# Patient Record
Sex: FEMALE | Race: WHITE | Employment: FULL TIME | URBAN - METROPOLITAN AREA
[De-identification: names, ages, dates, MRNs, and addresses within clinical notes are randomized per-mention and may not be internally consistent; named-entity substitution may affect disease eponyms.]

---

## 2019-06-15 ENCOUNTER — HOSPITAL ENCOUNTER (EMERGENCY)
Age: 34
Discharge: HOME | End: 2019-06-15
Payer: COMMERCIAL

## 2019-06-15 VITALS
BODY MASS INDEX: 24.51 KG/M2 | RESPIRATION RATE: 20 BRPM | TEMPERATURE: 98.2 F | OXYGEN SATURATION: 98 % | HEART RATE: 75 BPM | DIASTOLIC BLOOD PRESSURE: 78 MMHG | SYSTOLIC BLOOD PRESSURE: 118 MMHG | WEIGHT: 134 LBS

## 2019-06-15 DIAGNOSIS — J02.9 VIRAL PHARYNGITIS: Primary | ICD-10-CM

## 2019-06-15 LAB — STREP GRP A PCR: NEGATIVE

## 2019-06-15 PROCEDURE — 87880 STREP A ASSAY W/OPTIC: CPT

## 2019-06-15 PROCEDURE — 99212 OFFICE O/P EST SF 10 MIN: CPT

## 2019-06-15 ASSESSMENT — PAIN DESCRIPTION - PAIN TYPE: TYPE: ACUTE PAIN

## 2019-06-15 ASSESSMENT — PAIN DESCRIPTION - LOCATION: LOCATION: THROAT

## 2019-06-15 ASSESSMENT — PAIN SCALES - GENERAL: PAINLEVEL_OUTOF10: 4

## 2019-06-15 NOTE — ED PROVIDER NOTES
This is a 75-year-old female who presents to urgent care complaining of a sore throat for the last day or 2. She wants to make sure it is not strep throat. She works in [place]. States pain is mild to moderate in nature at times. Denies any difficulty breathing or swallowing. No abdominal pain nausea vomiting diarrhea or urinary symptoms. Review of Systems   Constitutional:        Pertinent positives and negatives are stated within HPI, all other systems reviewed and are negative. Physical Exam   Constitutional: She is oriented to person, place, and time. She appears well-developed and well-nourished. HENT:   Head: Normocephalic and atraumatic. Right Ear: Hearing, tympanic membrane, external ear and ear canal normal.   Left Ear: Hearing, tympanic membrane, external ear and ear canal normal.   Nose: Nose normal. Right sinus exhibits no maxillary sinus tenderness and no frontal sinus tenderness. Left sinus exhibits no maxillary sinus tenderness and no frontal sinus tenderness. Mouth/Throat: Uvula is midline and mucous membranes are normal. No trismus in the jaw. No uvula swelling. Posterior oropharyngeal erythema (mild) present. No oropharyngeal exudate, posterior oropharyngeal edema or tonsillar abscesses. Eyes: Pupils are equal, round, and reactive to light. Conjunctivae, EOM and lids are normal.   Neck: Normal range of motion and full passive range of motion without pain. Neck supple. Cardiovascular: Normal rate, regular rhythm and normal heart sounds. No murmur heard. Pulmonary/Chest: Effort normal and breath sounds normal.   Abdominal: Soft. Bowel sounds are normal. There is no tenderness. There is no rigidity, no rebound, no guarding and no CVA tenderness. Musculoskeletal: She exhibits no edema. Neurological: She is alert and oriented to person, place, and time. She has normal strength. No cranial nerve deficit or sensory deficit.  Coordination and gait normal. GCS eye subscore is 4. GCS verbal subscore is 5. GCS motor subscore is 6. Skin: Skin is warm and dry. No abrasion and no rash noted. Nursing note and vitals reviewed. Procedures    MDM  Number of Diagnoses or Management Options  Viral pharyngitis:   Diagnosis management comments: Strep negative  prob viral  ibuprofen           --------------------------------------------- PAST HISTORY ---------------------------------------------  Past Medical History:  has a past medical history of Migraine and Thyroid disease. Past Surgical History:  has a past surgical history that includes Tonsillectomy (Bilateral). Social History:  reports that she quit smoking about 4 years ago. Her smoking use included cigarettes. She smoked 0.50 packs per day. She has never used smokeless tobacco. She reports that she drinks alcohol. She reports that she does not use drugs. Family History: family history is not on file. The patients home medications have been reviewed. Allergies: Pcn [penicillins]    -------------------------------------------------- RESULTS -------------------------------------------------  Results for orders placed or performed during the hospital encounter of 06/15/19   Strep Screen Group A Throat   Result Value Ref Range    Strep Grp A PCR Negative Negative     No orders to display       ------------------------- NURSING NOTES AND VITALS REVIEWED ---------------------------   The nursing notes within the ED encounter and vital signs as below have been reviewed.    /78   Pulse 75   Temp 98.2 °F (36.8 °C) (Oral)   Resp 20   Wt 134 lb (60.8 kg)   LMP 06/07/2019   SpO2 98%   BMI 24.51 kg/m²   Oxygen Saturation Interpretation: Normal      ------------------------------------------ PROGRESS NOTES ------------------------------------------   I have spoken with the patient and discussed todays results, in addition to providing specific details for the plan of care and counseling regarding the

## 2021-05-09 ENCOUNTER — HOSPITAL ENCOUNTER (EMERGENCY)
Age: 36
Discharge: HOME | End: 2021-05-09
Payer: COMMERCIAL

## 2021-05-09 VITALS
RESPIRATION RATE: 16 BRPM | TEMPERATURE: 97.8 F | HEART RATE: 74 BPM | WEIGHT: 135 LBS | DIASTOLIC BLOOD PRESSURE: 79 MMHG | SYSTOLIC BLOOD PRESSURE: 116 MMHG | OXYGEN SATURATION: 100 % | BODY MASS INDEX: 24.84 KG/M2 | HEIGHT: 62 IN

## 2021-05-09 DIAGNOSIS — H10.9 CONJUNCTIVITIS OF BOTH EYES, UNSPECIFIED CONJUNCTIVITIS TYPE: Primary | ICD-10-CM

## 2021-05-09 PROCEDURE — 99211 OFF/OP EST MAY X REQ PHY/QHP: CPT

## 2021-05-09 RX ORDER — TOBRAMYCIN 3 MG/ML
1 SOLUTION/ DROPS OPHTHALMIC EVERY 4 HOURS
Qty: 1 BOTTLE | Refills: 0 | Status: SHIPPED | OUTPATIENT
Start: 2021-05-09 | End: 2021-05-19

## 2021-05-09 ASSESSMENT — PAIN SCALES - GENERAL: PAINLEVEL_OUTOF10: 4

## 2021-05-09 ASSESSMENT — PAIN DESCRIPTION - PROGRESSION: CLINICAL_PROGRESSION: GRADUALLY WORSENING

## 2021-05-09 ASSESSMENT — PAIN DESCRIPTION - LOCATION: LOCATION: EYE

## 2021-05-09 ASSESSMENT — PAIN DESCRIPTION - ORIENTATION: ORIENTATION: RIGHT;LEFT

## 2021-05-09 ASSESSMENT — PAIN DESCRIPTION - DESCRIPTORS: DESCRIPTORS: BURNING;DISCOMFORT

## 2021-05-09 NOTE — ED PROVIDER NOTES
HPI:  5/9/21,   Time: 10:45 AM EDT         Vielka Langford is a 38 y.o. female presenting to the ED for irritation to the bilateral eyes, beginning yesterday ago. The complaint has been persistent, mild in severity, and worsened by nothing. Complaining of irritation to the bilateral eyes which she states began yesterday. States upon waking today there was large amounts of crusted material and drainage from the bilateral eyes left worse than the right. She does wear contact lens however currently has her eyeglasses on. Denies any blurred vision or double vision. ROS:   Pertinent positives and negatives are stated within HPI, all other systems reviewed and are negative.  --------------------------------------------- PAST HISTORY ---------------------------------------------  Past Medical History:  has a past medical history of Migraine, Seasonal allergies, and Thyroid disease. Past Surgical History:  has a past surgical history that includes Tonsillectomy (Bilateral). Social History:  reports that she quit smoking about 5 years ago. Her smoking use included cigarettes. She smoked 0.50 packs per day. She has never used smokeless tobacco. She reports current alcohol use. She reports that she does not use drugs. Family History: family history is not on file. The patients home medications have been reviewed. Allergies: Pcn [penicillins]    -------------------------------------------------- RESULTS -------------------------------------------------  All laboratory and radiology results have been personally reviewed by myself   LABS:  No results found for this visit on 05/09/21. RADIOLOGY:  Interpreted by Radiologist.  No orders to display       ------------------------- NURSING NOTES AND VITALS REVIEWED ---------------------------   The nursing notes within the ED encounter and vital signs as below have been reviewed.    /79   Pulse 74   Temp 97.8 °F (36.6 °C) (Infrared)   Resp 16

## 2024-05-28 ENCOUNTER — TELEPHONE (OUTPATIENT)
Dept: BREAST CENTER | Age: 39
End: 2024-05-28

## 2024-05-28 NOTE — TELEPHONE ENCOUNTER
Patient is a new referral to the breast clinic. A breast biopsy has been recommended. She does not take any blood thinners. She will obtain a disc from [facility] with her recent mammogram and ultrasound on it and then bring it to our center for further review before proceeding with a biopsy.  No other imaging prior to this date.

## 2024-05-30 ENCOUNTER — TELEPHONE (OUTPATIENT)
Dept: BREAST CENTER | Age: 39
End: 2024-05-30

## 2024-05-30 DIAGNOSIS — N64.59 ABNORMAL BREAST FINDING: Primary | ICD-10-CM

## 2024-05-30 NOTE — TELEPHONE ENCOUNTER
Patient notified of her imaging review and recommendations. [facility] Tracking form scanned to chart. Will place orders and have schedulers call her to coordinate.

## 2024-06-06 ENCOUNTER — HOSPITAL ENCOUNTER (OUTPATIENT)
Dept: GENERAL RADIOLOGY | Age: 39
Discharge: HOME | End: 2024-06-08
Attending: SURGERY
Payer: COMMERCIAL

## 2024-06-06 VITALS — WEIGHT: 128 LBS | BODY MASS INDEX: 23.55 KG/M2 | HEIGHT: 62 IN

## 2024-06-06 DIAGNOSIS — N64.59 ABNORMAL BREAST FINDING: ICD-10-CM

## 2024-06-06 PROCEDURE — 77065 DX MAMMO INCL CAD UNI: CPT

## 2024-06-06 PROCEDURE — 19083 BX BREAST 1ST LESION US IMAG: CPT

## 2024-06-06 PROCEDURE — 10035 PLMT SFT TISS LOCLZJ DEV 1ST: CPT

## 2024-06-06 PROCEDURE — 76942 ECHO GUIDE FOR BIOPSY: CPT

## 2024-06-06 PROCEDURE — 76882 US LMTD JT/FCL EVL NVASC XTR: CPT

## 2024-06-06 NOTE — PROGRESS NOTES
Met with patient and her  prior to her biopsies. Instructed on ultrasound guided right breast and right axillary core needle biopsies with clip placements. Denies use of blood thinners or aspirin products within the past 5 days. I remained with her during the biopsy. She tolerated biopsy well. Instructed that results will be available in approximately 3-5 business days. She does not have an active [facility] MyChart account but will attempt to sign up for it. She is agreeable to discussion of breast / axillary biopsy results by phone. Instructed that her physician will also be notified of results. Provided with folder containing contact information and post biopsy discharge instructions. Instructed to call if she has any questions or concerns about her biopsy. Verbalizes understanding.

## 2024-06-12 LAB — SURGICAL PATHOLOGY REPORT: NORMAL

## 2024-06-13 ENCOUNTER — TELEPHONE (OUTPATIENT)
Dept: GENERAL RADIOLOGY | Age: 39
End: 2024-06-13

## 2024-06-13 ENCOUNTER — TELEPHONE (OUTPATIENT)
Dept: BREAST CENTER | Age: 39
End: 2024-06-13

## 2024-06-13 DIAGNOSIS — Z17.0 MALIGNANT NEOPLASM OF LEFT BREAST IN FEMALE, ESTROGEN RECEPTOR POSITIVE, UNSPECIFIED SITE OF BREAST (HCC): Primary | ICD-10-CM

## 2024-06-13 DIAGNOSIS — C50.912 MALIGNANT NEOPLASM OF LEFT BREAST IN FEMALE, ESTROGEN RECEPTOR POSITIVE, UNSPECIFIED SITE OF BREAST (HCC): Primary | ICD-10-CM

## 2024-06-13 NOTE — TELEPHONE ENCOUNTER
Call placed to patient. She is a new referral for newly diagnosed right breast cancer +/+/-. Patient had imaging at [facility] but right breast and right axilla biopsy was performed at [facility].  Discussed consultation with patient and arrival time of 0930 on 6/14/24 for CXR and office visit with Dr Hill at 1030. Patient states her , Cliff will probably be with her at the appointment.    Electronically signed by Anyi Armenta RN on 6/13/24 at 10:28 AM EDT

## 2024-06-13 NOTE — TELEPHONE ENCOUNTER
Per patient request at time of her right axillary and right breast biopsies, I called with her with the results. Instructed that the results of her right axillary biopsy indicates benign lymph node and adipose tissue segments. Instructed that the results of her right breast biopsy indicates invasive ductal carcinoma (ER / CT positive, HER-2 negative). Instructed on cancer type and hormone receptor status. Patient has already been referred to Dr. Austyn Quesada. I advised her that his office will be in contact with her soon. Instructed on next steps including surgical consultation and metastatic workup. Also, patient knows that Dr. Quesada will refer her to a medical oncologist. Questions answered to her apparent satisfaction. She is agreeable to receiving information by mail. Packet with information on treatment of invasive breast cancer and local resources placed in outgoing mail bin.  Breast pathology report faxed to Dr. Austyn Quesada.

## 2024-06-14 ENCOUNTER — TELEPHONE (OUTPATIENT)
Dept: BREAST CENTER | Age: 39
End: 2024-06-14

## 2024-06-14 ENCOUNTER — OFFICE VISIT (OUTPATIENT)
Dept: BREAST CENTER | Age: 39
End: 2024-06-14
Payer: COMMERCIAL

## 2024-06-14 ENCOUNTER — TELEPHONE (OUTPATIENT)
Dept: CASE MANAGEMENT | Age: 39
End: 2024-06-14

## 2024-06-14 ENCOUNTER — HOSPITAL ENCOUNTER (OUTPATIENT)
Dept: GENERAL RADIOLOGY | Age: 39
End: 2024-06-14
Attending: SURGERY
Payer: COMMERCIAL

## 2024-06-14 VITALS
HEART RATE: 92 BPM | BODY MASS INDEX: 23.74 KG/M2 | OXYGEN SATURATION: 98 % | SYSTOLIC BLOOD PRESSURE: 112 MMHG | HEIGHT: 62 IN | WEIGHT: 129 LBS | RESPIRATION RATE: 12 BRPM | TEMPERATURE: 98.2 F | DIASTOLIC BLOOD PRESSURE: 68 MMHG

## 2024-06-14 DIAGNOSIS — Z85.3 PERSONAL HISTORY OF MALIGNANT NEOPLASM OF BREAST: ICD-10-CM

## 2024-06-14 DIAGNOSIS — Z17.0 MALIGNANT NEOPLASM OF LEFT BREAST IN FEMALE, ESTROGEN RECEPTOR POSITIVE, UNSPECIFIED SITE OF BREAST (HCC): ICD-10-CM

## 2024-06-14 DIAGNOSIS — C50.911 INVASIVE DUCTAL CARCINOMA OF BREAST, FEMALE, RIGHT (HCC): Primary | ICD-10-CM

## 2024-06-14 DIAGNOSIS — C50.912 MALIGNANT NEOPLASM OF LEFT BREAST IN FEMALE, ESTROGEN RECEPTOR POSITIVE, UNSPECIFIED SITE OF BREAST (HCC): ICD-10-CM

## 2024-06-14 LAB
ALBUMIN: 4.5 G/DL (ref 3.5–5.2)
ALP BLD-CCNC: 77 U/L (ref 35–104)
ALT SERPL-CCNC: 6 U/L (ref 0–32)
ANION GAP SERPL CALCULATED.3IONS-SCNC: 14 MMOL/L (ref 7–16)
AST SERPL-CCNC: 14 U/L (ref 0–31)
BASOPHILS ABSOLUTE: 0.05 K/UL (ref 0–0.2)
BASOPHILS RELATIVE PERCENT: 1 % (ref 0–2)
BILIRUB SERPL-MCNC: 0.5 MG/DL (ref 0–1.2)
BUN BLDV-MCNC: 13 MG/DL (ref 6–20)
CALCIUM SERPL-MCNC: 9.2 MG/DL (ref 8.6–10.2)
CHLORIDE BLD-SCNC: 108 MMOL/L (ref 98–107)
CO2: 16 MMOL/L (ref 22–29)
CREAT SERPL-MCNC: 0.7 MG/DL (ref 0.5–1)
EOSINOPHILS ABSOLUTE: 0.03 K/UL (ref 0.05–0.5)
EOSINOPHILS RELATIVE PERCENT: 1 % (ref 0–6)
GFR, ESTIMATED: >90 ML/MIN/1.73M2
GLUCOSE BLD-MCNC: 97 MG/DL (ref 74–99)
HCT VFR BLD CALC: 43.5 % (ref 34–48)
HEMOGLOBIN: 14.6 G/DL (ref 11.5–15.5)
IMMATURE GRANULOCYTES %: 1 % (ref 0–5)
IMMATURE GRANULOCYTES ABSOLUTE: 0.03 K/UL (ref 0–0.58)
LYMPHOCYTES ABSOLUTE: 2.16 K/UL (ref 1.5–4)
LYMPHOCYTES RELATIVE PERCENT: 37 % (ref 20–42)
MCH RBC QN AUTO: 31.8 PG (ref 26–35)
MCHC RBC AUTO-ENTMCNC: 33.6 G/DL (ref 32–34.5)
MCV RBC AUTO: 94.8 FL (ref 80–99.9)
MONOCYTES ABSOLUTE: 0.26 K/UL (ref 0.1–0.95)
MONOCYTES RELATIVE PERCENT: 4 % (ref 2–12)
NEUTROPHILS ABSOLUTE: 3.37 K/UL (ref 1.8–7.3)
NEUTROPHILS RELATIVE PERCENT: 57 % (ref 43–80)
PDW BLD-RTO: 12.5 % (ref 11.5–15)
PLATELET # BLD: 248 K/UL (ref 130–450)
PMV BLD AUTO: 11.1 FL (ref 7–12)
POTASSIUM SERPL-SCNC: 4.2 MMOL/L (ref 3.5–5)
RBC # BLD: 4.59 M/UL (ref 3.5–5.5)
SODIUM BLD-SCNC: 138 MMOL/L (ref 132–146)
TOTAL PROTEIN: 7.2 G/DL (ref 6.4–8.3)
WBC # BLD: 5.9 K/UL (ref 4.5–11.5)

## 2024-06-14 PROCEDURE — 71046 X-RAY EXAM CHEST 2 VIEWS: CPT

## 2024-06-14 PROCEDURE — G8420 CALC BMI NORM PARAMETERS: HCPCS | Performed by: SURGERY

## 2024-06-14 PROCEDURE — G8427 DOCREV CUR MEDS BY ELIG CLIN: HCPCS | Performed by: SURGERY

## 2024-06-14 PROCEDURE — 99203 OFFICE O/P NEW LOW 30 MIN: CPT | Performed by: SURGERY

## 2024-06-14 PROCEDURE — 1036F TOBACCO NON-USER: CPT | Performed by: SURGERY

## 2024-06-14 PROCEDURE — 99205 OFFICE O/P NEW HI 60 MIN: CPT | Performed by: SURGERY

## 2024-06-14 PROCEDURE — 36415 COLL VENOUS BLD VENIPUNCTURE: CPT | Performed by: SURGERY

## 2024-06-14 RX ORDER — ZONISAMIDE 100 MG/1
CAPSULE ORAL
COMMUNITY
Start: 2023-06-12

## 2024-06-14 RX ORDER — FLUTICASONE PROPIONATE 50 MCG
SPRAY, SUSPENSION (ML) NASAL
COMMUNITY
Start: 2024-05-22

## 2024-06-14 NOTE — PROGRESS NOTES
[facility]  HISTORY & PHYSICAL  ATTENDING NOTE    Patient's Name/Date of Birth: Linda Bustamante / 1985    Date: 2024     Chief Complaint   Patient presents with    Breast Cancer     Right breast Invasive Ductal carcinoma-patient found lump herself  - imaging [facility] [address] / Biopsy [facility]         Linda Bustamante presents for evaluation of a mammographic abnormality.    PCP: Yolis Chandler MD. Gynecologist: Martin.    Referred by:  Dr. Chandler    The mammogram was performed at [facility] on 2024. The patient has not noted a change in BSE since presentation.  Patient denies nipple discharge. Patient denies a personal history of breast cancer.  Breast cancer risk factors include family hx on father's side and gender.  Ashkenazi Yazdanism Ancestry: No.    [place] on may 21    OBSTETRIC RELATED HISTORY:  Age of menarche was 13.   Age of menopause was N/a    Patient denies hormonal therapy.   Patient is .   Age of first live birth was 14.   Patient did not breast feed.  Is patient interested in fertility information about fertility preservation? No    CANCER SURVEILLANCE HISTORY:  Mammograms: No   Breast MRI's: No   Breast Biopsies: No   Colonoscopy: No   GI Polyps: Not Applicable   EGD: No   Pelvic Exam: Yes   Pap Smear: Yes   Dermatology: No   Lung screening: no  H/O DVT:  no  H/O Radiation:  no    Children ar 15y to 23y        Estimated body mass index is 23.59 kg/m² as calculated from the following:    Height as of this encounter: 1.575 m (5' 2\").    Weight as of this encounter: 58.5 kg (129 lb).  Bra Size: 36B    Because violence is so common, we ask all our patients: are you in a relationship or do you live with a person who threatens, hurts, or controls you:  no    Patient drinks significant caffeinated beverages. Patient does not smoke cigarettes. Patient does not use recreational drugs.      Past Medical History:   Diagnosis Date    Hypothyroidism     Invasive

## 2024-06-14 NOTE — TELEPHONE ENCOUNTER
Authorization has been obtained for breast MRI  [id number] -- Approval # [id number] valid 6/14/24 - 8/13/24  per [facility] insurance

## 2024-06-14 NOTE — TELEPHONE ENCOUNTER
Met with patient regarding her recent breast cancer diagnosis at surgical consultation appointment with . Reviewed pathology report.Instructed patient on her  breast biopsy pathology findings including cancer type (IDC) and hormone receptor status (ER+, RI+, Her2-). Instructed on next steps including breast surgery options per 's recommendations and any additional imaging that may be required. Provided with extensive literature including \"Be A Survivor: Your guide to Breast Cancer Treatment\", chapter 4 reviewed, Your Guide to Your Breast Cancer Pathology Report, NCCN Patient Resource card,American College of Surgeons Exercises after Breast Surgery, written information from [employer] Lymphedema:The Basics and American Cancer Society Clinical Trials.Today patient received copy of their pathology report as well as a list of [facility] Medical Oncology providers, information on diagnosis, transportation resources and local/online support group resources. I also provided patient with flyers from CDC.gov on Be The [place], from [employer] Breast Cancer Concerns for Women under 45 along with flyer from [employer]. Encouraged patient to call the office with any medical oncology questions. Patient verbalizes understanding and appreciative of nurse navigator visit. MELBA SwansonN,RN-OCN

## 2024-06-14 NOTE — PATIENT INSTRUCTIONS
Office will get authorization for breast MRI. Once authorization done schedulers will call to schedule. They are done on Tuesday and Thursday in same office as .      will call with genetic testing results.     Any questions or concerns, please contact the office at [phone].

## 2024-06-21 LAB
Lab: NEGATIVE
Lab: NORMAL

## 2024-06-25 ENCOUNTER — HOSPITAL ENCOUNTER (OUTPATIENT)
Dept: MRI IMAGING | Age: 39
Discharge: HOME | End: 2024-06-27
Payer: COMMERCIAL

## 2024-06-25 ENCOUNTER — TELEPHONE (OUTPATIENT)
Dept: SURGERY | Age: 39
End: 2024-06-25

## 2024-06-25 DIAGNOSIS — C50.911 INVASIVE DUCTAL CARCINOMA OF BREAST, FEMALE, RIGHT (HCC): ICD-10-CM

## 2024-06-25 DIAGNOSIS — C50.911 INVASIVE DUCTAL CARCINOMA OF BREAST, FEMALE, RIGHT (HCC): Primary | ICD-10-CM

## 2024-06-25 PROCEDURE — 6360000004 HC RX CONTRAST MEDICATION: Performed by: RADIOLOGY

## 2024-06-25 PROCEDURE — C8908 MRI W/O FOL W/CONT, BREAST,: HCPCS

## 2024-06-25 PROCEDURE — A9585 GADOBUTROL INJECTION: HCPCS | Performed by: RADIOLOGY

## 2024-06-25 RX ORDER — GADOBUTROL 604.72 MG/ML
6 INJECTION INTRAVENOUS
Status: COMPLETED | OUTPATIENT
Start: 2024-06-25 | End: 2024-06-25

## 2024-06-25 RX ADMIN — GADOBUTROL 6 ML: 604.72 INJECTION INTRAVENOUS at 13:02

## 2024-06-25 NOTE — TELEPHONE ENCOUNTER
I called Linda and went over her MRI results and genetic testing.  I explained to her her genetic testing was negative.  I explained to her they do not test every possible gene out the least 81 genes and the most common genes that would cause breast cancer and she is negative for those.  I also went over her MRI of her breast which showed multiple other small masses that we did not know about.  The recommendation would be to do a mastectomy however she was against that she could have an ultrasound to see if we could better characterize these masses.  She states she is okay with mastectomy.  I went over the different aspects of reconstruction which would be implant-based or tissue base.  I explained to her most people in this area to go with implant-based and that is what our plastic surgeon usually does.  I explained to her that we could also give her a flat aesthetic closure but she would like to have some type of reconstruction.  I also talked to her about bilateral mastectomies however I told her that is not something that she has to do and that would be a personal choice.  I explained to her that just because she has breast cancer 1 side does not mean she is going to get it on the other side we can continue to monitor her with mammogram and MRIs.  She states she is most likely just going to do 1 side at this point in time.  I will place a referral to plastic surgery and we will schedule her surgery after her consultation with them.

## 2024-07-10 ENCOUNTER — OFFICE VISIT (OUTPATIENT)
Dept: SURGERY | Age: 39
End: 2024-07-10
Payer: COMMERCIAL

## 2024-07-10 VITALS
OXYGEN SATURATION: 97 % | TEMPERATURE: 97.8 F | HEART RATE: 63 BPM | SYSTOLIC BLOOD PRESSURE: 108 MMHG | BODY MASS INDEX: 23.34 KG/M2 | WEIGHT: 126.8 LBS | HEIGHT: 62 IN | DIASTOLIC BLOOD PRESSURE: 70 MMHG | RESPIRATION RATE: 18 BRPM

## 2024-07-10 DIAGNOSIS — C50.911 INVASIVE DUCTAL CARCINOMA OF BREAST, FEMALE, RIGHT (HCC): Primary | ICD-10-CM

## 2024-07-10 PROCEDURE — 99203 OFFICE O/P NEW LOW 30 MIN: CPT | Performed by: PLASTIC SURGERY

## 2024-07-10 PROCEDURE — G8427 DOCREV CUR MEDS BY ELIG CLIN: HCPCS | Performed by: PLASTIC SURGERY

## 2024-07-10 PROCEDURE — G8420 CALC BMI NORM PARAMETERS: HCPCS | Performed by: PLASTIC SURGERY

## 2024-07-10 PROCEDURE — 1036F TOBACCO NON-USER: CPT | Performed by: PLASTIC SURGERY

## 2024-07-10 NOTE — PROGRESS NOTES
Department of Plastic Surgery - Adult  Attending Consult Note          CHIEF COMPLAINT:  History of right Breast Cancer    History Obtained From:  patient,     HISTORY OF PRESENT ILLNESS:                The patient is a 38 y.o. female who presents with History of right Breast Cancer.  She is here today to discuss her options on breast reconstruction. The patient has met with Dr. Hill and has elected to proceed with  mastectomy.  She has no treatments of chemotherapy and will likely not be receiving radiation after mastectomy.  The patients Breast Cancer History is obtained from the patient and also notes from the care team.      Past Medical History:    Past Medical History:   Diagnosis Date    Hypothyroidism     Invasive ductal carcinoma of right breast (HCC) 2024    Migraine     Seasonal allergies     Thyroid disease      Past Surgical History:    Past Surgical History:   Procedure Laterality Date    MARIO NEEDLE BIOPSY LYMPH NODE SUPERFICIAL  2024    MARIO NEEDLE BIOPSY LYMPH NODE SUPERFICIAL 2024 [facility]    MARIO US PERQ DEVICE SOFT TISSUE PLMT  FIRST LESION  2024    MARIO US PERQ DEVICE SOFT TISSUE PLMT  FIRST LESION 2024 [facility]    TONSILLECTOMY Bilateral     US BREAST BIOPSY W LOC DEVICE 1ST LESION RIGHT Right 2024    US BREAST BIOPSY W LOC DEVICE 1ST LESION RIGHT 2024 [facility]     Current Medications:   No current facility-administered medications for this visit.  Allergies:  Pcn [penicillins]    Social History:   Social History     Socioeconomic History    Marital status:      Spouse name: Not on file    Number of children: Not on file    Years of education: Not on file    Highest education level: Not on file   Occupational History    Not on file   Tobacco Use    Smoking status: Former     Current packs/day: 0.00     Types: Cigarettes     Quit date: 2015     Years since quittin.0    Smokeless tobacco: Never   Substance and Sexual Activity

## 2024-07-16 ENCOUNTER — PREP FOR PROCEDURE (OUTPATIENT)
Dept: BREAST CENTER | Age: 39
End: 2024-07-16

## 2024-07-16 ENCOUNTER — TELEPHONE (OUTPATIENT)
Dept: SURGERY | Age: 39
End: 2024-07-16

## 2024-07-16 ENCOUNTER — TELEPHONE (OUTPATIENT)
Dept: BREAST CENTER | Age: 39
End: 2024-07-16

## 2024-07-16 DIAGNOSIS — C50.911 INVASIVE DUCTAL CARCINOMA OF BREAST, FEMALE, RIGHT (HCC): Primary | ICD-10-CM

## 2024-07-16 NOTE — TELEPHONE ENCOUNTER
Surgery has been scheduled at [address] on 8/13/24, Pre-Admission Testing will call you prior to surgery to inform you arrival time and any other additional directions,if they are unable to reach you,please call them two days prior at [phone].  If taking Fish Oil, Vitamins, two weeks prior to surgery stop taking. If taking NSAIDS (such as Aspirin, Ibuprofen) anticoagulants please consult with your prescribing physician to get further instructions on when to stop medication prior to surgery that is scheduled, patient understood.    Pre-Auth #: [id number]  CPT Codes: 40089, 68248  ICD 10: C50.911      Phone discussion was had with the patient today regarding upcoming surgery.  I had a discussion with the patient that although the patient's insurance company has approved the procedure proposed, there is no guarantee of payment by the insurance company on their final review following the procedure. This is also reflected in the letter received by this office and the patient from the insurance company.    The patient has voiced to me complete understanding of this scenario, understands that they will be responsible for their individual deductable/coinsurance balance as an out-of-pocket expense and possibly the financial responsibility of the surgical procedure if the patient's insurance company elects not to pay for certain or all services.    The patient elects to proceed with the proposed surgical plan.

## 2024-08-07 NOTE — PROGRESS NOTES
[facility]   PRE-ADMISSION TESTING GENERAL INSTRUCTIONS  PAT Phone Number: [phone]      GENERAL INSTRUCTIONS:    [x] Antibacterial Soap Shower the Night before AND the morning of surgery.  [] CHG Wipes instruction sheet and wipes given.  [x] Do not wear makeup, lotions, powders, deodorant the morning of surgery.  [x] No solid food after midnight. You may have SIPS of clear liquids up until 2 hours before your arrival time to the hospital.   [x] You may brush your teeth, gargle, but do not swallow water.   [] No tobacco products, illegal drugs, or alcohol within 24 hours of your surgery.  [x] Jewelry or valuables should not be brought to the hospital. All body and/or tongue piercing's must be removed prior to arriving to hospital. No contact lens or hair pins.   [x] Arrange transportation with a responsible adult  to and from the hospital. Arrange for someone to be with you for the remainder of the day and for 24 hours after your procedure due to having had anesthesia.          -Who will be your  for transportation? Cliff, spouse        -Who will be staying with you for 24 hrs after your procedure? cliff  [x] Bring insurance card and photo ID.  [] Bring copy of living will or healthcare power of  papers to be placed in your electronic record.  [x] Urine Pregnancy test will be preformed the day of surgery. A specimen sample may be brought from home.  [] Transfusion (Green) Bracelet: Please bring with you to hospital, day of surgery.     PARKING INSTRUCTIONS:     [x] ARRIVAL DATE & TIME: 8/13 @ 0630  [x] Times are subject to change. We will contact you the business day before surgery if that were to occur.  [x] Enter into the [facility] Entrance. Two people may accompany you. Masks are not required.  [x] Parking Lot \"I\" is where you will park. It is located on the corner of [address] and [address]. The entrance is on [address].   Only one

## 2024-08-12 ENCOUNTER — ANESTHESIA EVENT (OUTPATIENT)
Dept: OPERATING ROOM | Age: 39
End: 2024-08-12
Payer: COMMERCIAL

## 2024-08-12 RX ORDER — SODIUM CHLORIDE 9 MG/ML
INJECTION, SOLUTION INTRAVENOUS CONTINUOUS
Status: CANCELLED | OUTPATIENT
Start: 2024-08-12

## 2024-08-12 RX ORDER — SODIUM CHLORIDE 9 MG/ML
INJECTION, SOLUTION INTRAVENOUS PRN
Status: CANCELLED | OUTPATIENT
Start: 2024-08-12

## 2024-08-12 RX ORDER — SODIUM CHLORIDE 0.9 % (FLUSH) 0.9 %
5-40 SYRINGE (ML) INJECTION EVERY 12 HOURS SCHEDULED
Status: CANCELLED | OUTPATIENT
Start: 2024-08-12

## 2024-08-12 RX ORDER — SODIUM CHLORIDE 0.9 % (FLUSH) 0.9 %
5-40 SYRINGE (ML) INJECTION PRN
Status: CANCELLED | OUTPATIENT
Start: 2024-08-12

## 2024-08-13 ENCOUNTER — ANESTHESIA (OUTPATIENT)
Dept: OPERATING ROOM | Age: 39
End: 2024-08-13
Payer: COMMERCIAL

## 2024-08-13 ENCOUNTER — HOSPITAL ENCOUNTER (OUTPATIENT)
Dept: NUCLEAR MEDICINE | Age: 39
Discharge: HOME | End: 2024-08-15
Payer: COMMERCIAL

## 2024-08-13 ENCOUNTER — HOSPITAL ENCOUNTER (OUTPATIENT)
Age: 39
Setting detail: OUTPATIENT SURGERY
Discharge: HOME | End: 2024-08-13
Attending: SURGERY | Admitting: SURGERY
Payer: COMMERCIAL

## 2024-08-13 VITALS
RESPIRATION RATE: 19 BRPM | OXYGEN SATURATION: 100 % | DIASTOLIC BLOOD PRESSURE: 81 MMHG | BODY MASS INDEX: 22.63 KG/M2 | WEIGHT: 123 LBS | SYSTOLIC BLOOD PRESSURE: 119 MMHG | HEIGHT: 62 IN | HEART RATE: 78 BPM | TEMPERATURE: 97.6 F

## 2024-08-13 DIAGNOSIS — C50.911 INVASIVE DUCTAL CARCINOMA OF BREAST, FEMALE, RIGHT (HCC): ICD-10-CM

## 2024-08-13 DIAGNOSIS — C50.911 MALIGNANT NEOPLASM OF RIGHT BREAST (HCC): ICD-10-CM

## 2024-08-13 DIAGNOSIS — G89.18 POST-OP PAIN: ICD-10-CM

## 2024-08-13 DIAGNOSIS — Z01.812 PRE-OPERATIVE LABORATORY EXAMINATION: Primary | ICD-10-CM

## 2024-08-13 LAB
ERYTHROCYTE [DISTWIDTH] IN BLOOD BY AUTOMATED COUNT: 12.1 % (ref 11.5–15)
HCG, URINE, POC: NEGATIVE
HCT VFR BLD AUTO: 43.9 % (ref 34–48)
HGB BLD-MCNC: 15 G/DL (ref 11.5–15.5)
Lab: NORMAL
MCH RBC QN AUTO: 33.3 PG (ref 26–35)
MCHC RBC AUTO-ENTMCNC: 34.2 G/DL (ref 32–34.5)
MCV RBC AUTO: 97.6 FL (ref 80–99.9)
NEGATIVE QC PASS/FAIL: NORMAL
PLATELET # BLD AUTO: 229 K/UL (ref 130–450)
PMV BLD AUTO: 10.8 FL (ref 7–12)
POSITIVE QC PASS/FAIL: NORMAL
RBC # BLD AUTO: 4.5 M/UL (ref 3.5–5.5)
WBC OTHER # BLD: 6.5 K/UL (ref 4.5–11.5)

## 2024-08-13 PROCEDURE — 19357 TISS XPNDR PLMT BRST RCNSTJ: CPT | Performed by: PHYSICIAN ASSISTANT

## 2024-08-13 PROCEDURE — 38792 RA TRACER ID OF SENTINL NODE: CPT

## 2024-08-13 PROCEDURE — 76942 ECHO GUIDE FOR BIOPSY: CPT | Performed by: ANESTHESIOLOGY

## 2024-08-13 PROCEDURE — 6360000002 HC RX W HCPCS

## 2024-08-13 PROCEDURE — 3600000015 HC SURGERY LEVEL 5 ADDTL 15MIN: Performed by: SURGERY

## 2024-08-13 PROCEDURE — 3600000005 HC SURGERY LEVEL 5 BASE: Performed by: SURGERY

## 2024-08-13 PROCEDURE — A9520 TC99 TILMANOCEPT DIAG 0.5MCI: HCPCS | Performed by: RADIOLOGY

## 2024-08-13 PROCEDURE — 6360000002 HC RX W HCPCS: Performed by: ANESTHESIOLOGY

## 2024-08-13 PROCEDURE — 3700000001 HC ADD 15 MINUTES (ANESTHESIA): Performed by: SURGERY

## 2024-08-13 PROCEDURE — 15777 ACELLULAR DERM MATRIX IMPLT: CPT | Performed by: PLASTIC SURGERY

## 2024-08-13 PROCEDURE — 7100000010 HC PHASE II RECOVERY - FIRST 15 MIN: Performed by: SURGERY

## 2024-08-13 PROCEDURE — 38792 RA TRACER ID OF SENTINL NODE: CPT | Performed by: SURGERY

## 2024-08-13 PROCEDURE — 3430000000 HC RX DIAGNOSTIC RADIOPHARMACEUTICAL: Performed by: RADIOLOGY

## 2024-08-13 PROCEDURE — 19357 TISS XPNDR PLMT BRST RCNSTJ: CPT | Performed by: PLASTIC SURGERY

## 2024-08-13 PROCEDURE — C1713 ANCHOR/SCREW BN/BN,TIS/BN: HCPCS | Performed by: SURGERY

## 2024-08-13 PROCEDURE — 2580000003 HC RX 258: Performed by: PLASTIC SURGERY

## 2024-08-13 PROCEDURE — C1789 PROSTHESIS, BREAST, IMP: HCPCS | Performed by: SURGERY

## 2024-08-13 PROCEDURE — 2500000003 HC RX 250 WO HCPCS

## 2024-08-13 PROCEDURE — 7100000001 HC PACU RECOVERY - ADDTL 15 MIN: Performed by: SURGERY

## 2024-08-13 PROCEDURE — 38900 IO MAP OF SENT LYMPH NODE: CPT | Performed by: SURGERY

## 2024-08-13 PROCEDURE — 2720000010 HC SURG SUPPLY STERILE: Performed by: SURGERY

## 2024-08-13 PROCEDURE — 2500000003 HC RX 250 WO HCPCS: Performed by: ANESTHESIOLOGY

## 2024-08-13 PROCEDURE — 85027 COMPLETE CBC AUTOMATED: CPT

## 2024-08-13 PROCEDURE — 19303 MAST SIMPLE COMPLETE: CPT | Performed by: SURGERY

## 2024-08-13 PROCEDURE — 2709999900 HC NON-CHARGEABLE SUPPLY: Performed by: SURGERY

## 2024-08-13 PROCEDURE — 7100000000 HC PACU RECOVERY - FIRST 15 MIN: Performed by: SURGERY

## 2024-08-13 PROCEDURE — 7100000011 HC PHASE II RECOVERY - ADDTL 15 MIN: Performed by: SURGERY

## 2024-08-13 PROCEDURE — 6370000000 HC RX 637 (ALT 250 FOR IP): Performed by: ANESTHESIOLOGY

## 2024-08-13 PROCEDURE — 3700000000 HC ANESTHESIA ATTENDED CARE: Performed by: SURGERY

## 2024-08-13 PROCEDURE — 38525 BIOPSY/REMOVAL LYMPH NODES: CPT | Performed by: SURGERY

## 2024-08-13 PROCEDURE — 6360000002 HC RX W HCPCS: Performed by: PLASTIC SURGERY

## 2024-08-13 PROCEDURE — 2580000003 HC RX 258

## 2024-08-13 PROCEDURE — 2500000003 HC RX 250 WO HCPCS: Performed by: PLASTIC SURGERY

## 2024-08-13 PROCEDURE — A4217 STERILE WATER/SALINE, 500 ML: HCPCS | Performed by: PLASTIC SURGERY

## 2024-08-13 DEVICE — GRAFT HUM TISS 132 SQ CM THK2-2.8MM THCK M PERF CNTOUR ACELLULAR DERM: Type: IMPLANTABLE DEVICE | Site: BREAST | Status: FUNCTIONAL

## 2024-08-13 DEVICE — STIMULAN® RAPID CURE PROVIDED STERILE FOR SINGLE PATIENT USE. STIMULAN® RAPID CURE CONTAINS CALCIUM SULFATE POWDER AND MIXING SOLUTION IN PRE-MEASURED QUANTITIES SO THAT WHEN MIXED TOGETHER IN A STERILE MIXING BOWL, THE RESULTANT PASTE IS TO BE DIGITALLY PACKED INTO OPEN BONE VOID/GAP TO SET INSITU OR PLACED INTO THE MOULD PROVIDED, THE MIXTURE SETS TO FORM BEADS. THE BIODEGRADABLE, RADIOPAQUE BEADS ARE RESORBED IN APPROXIMATELY 30 – 60 DAYS WHEN USED IN ACCORDANCE WITH THE DEVICE LABELLING. STIMULAN® RAPID CURE IS MANUFACTURED FROM SYNTHETIC IMPLANT GRADE CALCIUM SULFATE DIHYDRATE(CASO4.2H2O) THAT RESORBS AND IS REPLACED WITH BONE DURING THE HEALING PROCESS. ALSO, AS THE BONE VOID FILLER BEADS ARE BIODEGRADABLE AND BIOCOMPATIBLE, THEY MAY BE USED AT AN INFECTED SITE.
Type: IMPLANTABLE DEVICE | Site: BREAST | Status: FUNCTIONAL
Brand: STIMULAN® RAPID CURE

## 2024-08-13 DEVICE — SILTEX MEDIUM HEIGHT TISSUE EXPANDER STYLE 8200, 450CC
Type: IMPLANTABLE DEVICE | Site: BREAST | Status: FUNCTIONAL
Brand: MENTOR CPX 4 BREAST TISSUE EXPANDER

## 2024-08-13 RX ORDER — SODIUM CHLORIDE 9 MG/ML
INJECTION, SOLUTION INTRAVENOUS CONTINUOUS PRN
Status: DISCONTINUED | OUTPATIENT
Start: 2024-08-13 | End: 2024-08-13 | Stop reason: SDUPTHER

## 2024-08-13 RX ORDER — CEFAZOLIN SODIUM 1 G/3ML
INJECTION, POWDER, FOR SOLUTION INTRAMUSCULAR; INTRAVENOUS PRN
Status: DISCONTINUED | OUTPATIENT
Start: 2024-08-13 | End: 2024-08-13 | Stop reason: SDUPTHER

## 2024-08-13 RX ORDER — HYDROMORPHONE HYDROCHLORIDE 1 MG/ML
0.25 INJECTION, SOLUTION INTRAMUSCULAR; INTRAVENOUS; SUBCUTANEOUS EVERY 5 MIN PRN
Status: DISCONTINUED | OUTPATIENT
Start: 2024-08-13 | End: 2024-08-13 | Stop reason: HOSPADM

## 2024-08-13 RX ORDER — NALOXONE HYDROCHLORIDE 0.4 MG/ML
INJECTION, SOLUTION INTRAMUSCULAR; INTRAVENOUS; SUBCUTANEOUS PRN
Status: DISCONTINUED | OUTPATIENT
Start: 2024-08-13 | End: 2024-08-13 | Stop reason: HOSPADM

## 2024-08-13 RX ORDER — SODIUM CHLORIDE 0.9 % (FLUSH) 0.9 %
5-40 SYRINGE (ML) INJECTION PRN
Status: DISCONTINUED | OUTPATIENT
Start: 2024-08-13 | End: 2024-08-13 | Stop reason: HOSPADM

## 2024-08-13 RX ORDER — SODIUM CHLORIDE 0.9 % (FLUSH) 0.9 %
5-40 SYRINGE (ML) INJECTION EVERY 12 HOURS SCHEDULED
Status: DISCONTINUED | OUTPATIENT
Start: 2024-08-13 | End: 2024-08-13 | Stop reason: HOSPADM

## 2024-08-13 RX ORDER — OXYCODONE HYDROCHLORIDE 5 MG/1
10 TABLET ORAL PRN
Status: COMPLETED | OUTPATIENT
Start: 2024-08-13 | End: 2024-08-13

## 2024-08-13 RX ORDER — DIPHENHYDRAMINE HYDROCHLORIDE 50 MG/ML
12.5 INJECTION INTRAMUSCULAR; INTRAVENOUS
Status: DISCONTINUED | OUTPATIENT
Start: 2024-08-13 | End: 2024-08-13 | Stop reason: HOSPADM

## 2024-08-13 RX ORDER — PROPOFOL 10 MG/ML
INJECTION, EMULSION INTRAVENOUS PRN
Status: DISCONTINUED | OUTPATIENT
Start: 2024-08-13 | End: 2024-08-13 | Stop reason: SDUPTHER

## 2024-08-13 RX ORDER — OXYCODONE AND ACETAMINOPHEN 5; 325 MG/1; MG/1
1 TABLET ORAL EVERY 6 HOURS PRN
Qty: 15 TABLET | Refills: 0 | Status: SHIPPED | OUTPATIENT
Start: 2024-08-13 | End: 2024-08-20

## 2024-08-13 RX ORDER — MAGNESIUM HYDROXIDE 1200 MG/15ML
LIQUID ORAL CONTINUOUS PRN
Status: DISCONTINUED | OUTPATIENT
Start: 2024-08-13 | End: 2024-08-13 | Stop reason: HOSPADM

## 2024-08-13 RX ORDER — BUPIVACAINE HYDROCHLORIDE AND EPINEPHRINE 2.5; 5 MG/ML; UG/ML
INJECTION, SOLUTION EPIDURAL; INFILTRATION; INTRACAUDAL; PERINEURAL PRN
Status: DISCONTINUED | OUTPATIENT
Start: 2024-08-13 | End: 2024-08-13 | Stop reason: ALTCHOICE

## 2024-08-13 RX ORDER — SODIUM CHLORIDE 9 MG/ML
INJECTION, SOLUTION INTRAVENOUS CONTINUOUS
Status: DISCONTINUED | OUTPATIENT
Start: 2024-08-13 | End: 2024-08-13 | Stop reason: HOSPADM

## 2024-08-13 RX ORDER — LIDOCAINE HYDROCHLORIDE 20 MG/ML
INJECTION, SOLUTION INTRAVENOUS PRN
Status: DISCONTINUED | OUTPATIENT
Start: 2024-08-13 | End: 2024-08-13 | Stop reason: SDUPTHER

## 2024-08-13 RX ORDER — FENTANYL CITRATE 50 UG/ML
INJECTION, SOLUTION INTRAMUSCULAR; INTRAVENOUS PRN
Status: DISCONTINUED | OUTPATIENT
Start: 2024-08-13 | End: 2024-08-13 | Stop reason: SDUPTHER

## 2024-08-13 RX ORDER — GENTAMICIN 40 MG/ML
INJECTION, SOLUTION INTRAMUSCULAR; INTRAVENOUS PRN
Status: DISCONTINUED | OUTPATIENT
Start: 2024-08-13 | End: 2024-08-13 | Stop reason: HOSPADM

## 2024-08-13 RX ORDER — ROPIVACAINE HYDROCHLORIDE 5 MG/ML
30 INJECTION, SOLUTION EPIDURAL; INFILTRATION; PERINEURAL
Status: COMPLETED | OUTPATIENT
Start: 2024-08-13 | End: 2024-08-13

## 2024-08-13 RX ORDER — HYDROMORPHONE HYDROCHLORIDE 1 MG/ML
0.5 INJECTION, SOLUTION INTRAMUSCULAR; INTRAVENOUS; SUBCUTANEOUS EVERY 5 MIN PRN
Status: DISCONTINUED | OUTPATIENT
Start: 2024-08-13 | End: 2024-08-13 | Stop reason: HOSPADM

## 2024-08-13 RX ORDER — MIDAZOLAM HYDROCHLORIDE 2 MG/2ML
1 INJECTION, SOLUTION INTRAMUSCULAR; INTRAVENOUS EVERY 5 MIN PRN
Status: DISCONTINUED | OUTPATIENT
Start: 2024-08-13 | End: 2024-08-13 | Stop reason: HOSPADM

## 2024-08-13 RX ORDER — LIDOCAINE HYDROCHLORIDE 10 MG/ML
10 INJECTION, SOLUTION INFILTRATION; PERINEURAL
Status: COMPLETED | OUTPATIENT
Start: 2024-08-13 | End: 2024-08-13

## 2024-08-13 RX ORDER — ONDANSETRON 4 MG/1
4 TABLET, FILM COATED ORAL DAILY PRN
Qty: 12 TABLET | Refills: 1 | Status: SHIPPED | OUTPATIENT
Start: 2024-08-13

## 2024-08-13 RX ORDER — EPHEDRINE SULFATE/0.9% NACL/PF 25 MG/5 ML
SYRINGE (ML) INTRAVENOUS PRN
Status: DISCONTINUED | OUTPATIENT
Start: 2024-08-13 | End: 2024-08-13 | Stop reason: SDUPTHER

## 2024-08-13 RX ORDER — ACETAMINOPHEN 650 MG
TABLET, EXTENDED RELEASE ORAL PRN
Status: DISCONTINUED | OUTPATIENT
Start: 2024-08-13 | End: 2024-08-13 | Stop reason: HOSPADM

## 2024-08-13 RX ORDER — OXYCODONE HYDROCHLORIDE 5 MG/1
5 TABLET ORAL
Status: COMPLETED | OUTPATIENT
Start: 2024-08-13 | End: 2024-08-13

## 2024-08-13 RX ORDER — SODIUM CHLORIDE 9 MG/ML
INJECTION, SOLUTION INTRAVENOUS PRN
Status: DISCONTINUED | OUTPATIENT
Start: 2024-08-13 | End: 2024-08-13 | Stop reason: HOSPADM

## 2024-08-13 RX ORDER — CLINDAMYCIN HCL 300 MG
300 CAPSULE ORAL 3 TIMES DAILY
Qty: 15 CAPSULE | Refills: 0 | Status: SHIPPED | OUTPATIENT
Start: 2024-08-13 | End: 2024-08-18

## 2024-08-13 RX ORDER — ONDANSETRON 2 MG/ML
INJECTION INTRAMUSCULAR; INTRAVENOUS PRN
Status: DISCONTINUED | OUTPATIENT
Start: 2024-08-13 | End: 2024-08-13 | Stop reason: SDUPTHER

## 2024-08-13 RX ORDER — DEXAMETHASONE SODIUM PHOSPHATE 10 MG/ML
INJECTION INTRAMUSCULAR; INTRAVENOUS PRN
Status: DISCONTINUED | OUTPATIENT
Start: 2024-08-13 | End: 2024-08-13 | Stop reason: SDUPTHER

## 2024-08-13 RX ORDER — MEPERIDINE HYDROCHLORIDE 25 MG/ML
12.5 INJECTION INTRAMUSCULAR; INTRAVENOUS; SUBCUTANEOUS EVERY 5 MIN PRN
Status: DISCONTINUED | OUTPATIENT
Start: 2024-08-13 | End: 2024-08-13 | Stop reason: HOSPADM

## 2024-08-13 RX ORDER — OXYCODONE HYDROCHLORIDE 5 MG/1
5 TABLET ORAL PRN
Status: COMPLETED | OUTPATIENT
Start: 2024-08-13 | End: 2024-08-13

## 2024-08-13 RX ORDER — PHENYLEPHRINE HCL IN 0.9% NACL 1 MG/10 ML
SYRINGE (ML) INTRAVENOUS PRN
Status: DISCONTINUED | OUTPATIENT
Start: 2024-08-13 | End: 2024-08-13 | Stop reason: SDUPTHER

## 2024-08-13 RX ORDER — VANCOMYCIN HYDROCHLORIDE 500 MG/10ML
INJECTION, POWDER, LYOPHILIZED, FOR SOLUTION INTRAVENOUS PRN
Status: DISCONTINUED | OUTPATIENT
Start: 2024-08-13 | End: 2024-08-13 | Stop reason: HOSPADM

## 2024-08-13 RX ORDER — DROPERIDOL 2.5 MG/ML
INJECTION, SOLUTION INTRAMUSCULAR; INTRAVENOUS PRN
Status: DISCONTINUED | OUTPATIENT
Start: 2024-08-13 | End: 2024-08-13 | Stop reason: SDUPTHER

## 2024-08-13 RX ORDER — BUPIVACAINE HYDROCHLORIDE 5 MG/ML
INJECTION, SOLUTION EPIDURAL; INTRACAUDAL
Status: COMPLETED | OUTPATIENT
Start: 2024-08-13 | End: 2024-08-13

## 2024-08-13 RX ADMIN — EPHEDRINE SULFATE 5 MG: 5 INJECTION INTRAVENOUS at 09:27

## 2024-08-13 RX ADMIN — CEFAZOLIN 2 G: 1 INJECTION, POWDER, FOR SOLUTION INTRAMUSCULAR; INTRAVENOUS at 09:00

## 2024-08-13 RX ADMIN — HYDROMORPHONE HYDROCHLORIDE 0.5 MG: 1 INJECTION, SOLUTION INTRAMUSCULAR; INTRAVENOUS; SUBCUTANEOUS at 12:21

## 2024-08-13 RX ADMIN — Medication 200 MCG: at 10:24

## 2024-08-13 RX ADMIN — TILMANOCEPT 550 MICRO CURIE: KIT at 07:50

## 2024-08-13 RX ADMIN — FENTANYL CITRATE 25 MCG: 50 INJECTION, SOLUTION INTRAMUSCULAR; INTRAVENOUS at 10:43

## 2024-08-13 RX ADMIN — FENTANYL CITRATE 50 MCG: 50 INJECTION, SOLUTION INTRAMUSCULAR; INTRAVENOUS at 09:02

## 2024-08-13 RX ADMIN — FENTANYL CITRATE 50 MCG: 50 INJECTION, SOLUTION INTRAMUSCULAR; INTRAVENOUS at 08:52

## 2024-08-13 RX ADMIN — Medication 10 MG: at 10:43

## 2024-08-13 RX ADMIN — SODIUM CHLORIDE: 9 INJECTION, SOLUTION INTRAVENOUS at 10:51

## 2024-08-13 RX ADMIN — OXYCODONE 5 MG: 5 TABLET ORAL at 13:26

## 2024-08-13 RX ADMIN — LIDOCAINE HYDROCHLORIDE 100 MG: 20 INJECTION, SOLUTION INTRAVENOUS at 08:52

## 2024-08-13 RX ADMIN — Medication 25 MG: at 08:52

## 2024-08-13 RX ADMIN — Medication 100 MCG: at 10:58

## 2024-08-13 RX ADMIN — MIDAZOLAM 2 MG: 1 INJECTION INTRAMUSCULAR; INTRAVENOUS at 08:26

## 2024-08-13 RX ADMIN — ONDANSETRON 4 MG: 2 INJECTION INTRAMUSCULAR; INTRAVENOUS at 11:39

## 2024-08-13 RX ADMIN — DROPERIDOL 0.62 MG: 2.5 INJECTION, SOLUTION INTRAMUSCULAR; INTRAVENOUS at 10:01

## 2024-08-13 RX ADMIN — PROPOFOL 100 MG: 10 INJECTION, EMULSION INTRAVENOUS at 08:52

## 2024-08-13 RX ADMIN — Medication 15 MG: at 09:49

## 2024-08-13 RX ADMIN — FENTANYL CITRATE 50 MCG: 50 INJECTION, SOLUTION INTRAMUSCULAR; INTRAVENOUS at 10:01

## 2024-08-13 RX ADMIN — EPHEDRINE SULFATE 5 MG: 5 INJECTION INTRAVENOUS at 10:03

## 2024-08-13 RX ADMIN — DEXAMETHASONE SODIUM PHOSPHATE 10 MG: 10 INJECTION INTRAMUSCULAR; INTRAVENOUS at 09:00

## 2024-08-13 RX ADMIN — FENTANYL CITRATE 25 MCG: 50 INJECTION, SOLUTION INTRAMUSCULAR; INTRAVENOUS at 11:15

## 2024-08-13 RX ADMIN — BUPIVACAINE HYDROCHLORIDE 30 ML: 5 INJECTION, SOLUTION EPIDURAL; INTRACAUDAL at 08:38

## 2024-08-13 RX ADMIN — SODIUM CHLORIDE: 9 INJECTION, SOLUTION INTRAVENOUS at 08:45

## 2024-08-13 RX ADMIN — EPHEDRINE SULFATE 10 MG: 5 INJECTION INTRAVENOUS at 09:17

## 2024-08-13 RX ADMIN — LIDOCAINE HYDROCHLORIDE 10 ML: 10 INJECTION, SOLUTION INFILTRATION; PERINEURAL at 08:38

## 2024-08-13 RX ADMIN — OXYCODONE 5 MG: 5 TABLET ORAL at 12:55

## 2024-08-13 RX ADMIN — EPHEDRINE SULFATE 5 MG: 5 INJECTION INTRAVENOUS at 10:10

## 2024-08-13 RX ADMIN — ROPIVACAINE HYDROCHLORIDE 30 ML: 5 INJECTION EPIDURAL; INFILTRATION; PERINEURAL at 08:36

## 2024-08-13 ASSESSMENT — PAIN DESCRIPTION - DESCRIPTORS
DESCRIPTORS: ACHING;DISCOMFORT
DESCRIPTORS: DISCOMFORT;ACHING;SORE
DESCRIPTORS: TIGHTNESS
DESCRIPTORS: DISCOMFORT

## 2024-08-13 ASSESSMENT — PAIN DESCRIPTION - ONSET: ONSET: PROGRESSIVE

## 2024-08-13 ASSESSMENT — PAIN SCALES - GENERAL
PAINLEVEL_OUTOF10: 7
PAINLEVEL_OUTOF10: 5
PAINLEVEL_OUTOF10: 7

## 2024-08-13 ASSESSMENT — PAIN DESCRIPTION - PAIN TYPE
TYPE: SURGICAL PAIN;ACUTE PAIN
TYPE: SURGICAL PAIN;ACUTE PAIN

## 2024-08-13 ASSESSMENT — PAIN DESCRIPTION - FREQUENCY
FREQUENCY: INTERMITTENT
FREQUENCY: INTERMITTENT
FREQUENCY: CONTINUOUS

## 2024-08-13 ASSESSMENT — PAIN DESCRIPTION - ORIENTATION
ORIENTATION: RIGHT

## 2024-08-13 ASSESSMENT — PAIN DESCRIPTION - LOCATION
LOCATION: BREAST

## 2024-08-13 ASSESSMENT — PAIN - FUNCTIONAL ASSESSMENT: PAIN_FUNCTIONAL_ASSESSMENT: NONE - DENIES PAIN

## 2024-08-13 NOTE — PROGRESS NOTES
Patient tolerating oral intake     Person waiting for patient at bedside    Discharge instructions provided to patient, written and verbal, with significant other at bedside, patient verbalized understanding.      Iv site removed.      Assisted patient in getting dressed.     Transport requested via wheelchair.

## 2024-08-13 NOTE — INTERVAL H&P NOTE
Update History & Physical    The patient's History and Physical of July 16, 2024 was reviewed with the patient and I examined the patient. There was no change. The surgical site was confirmed by the patient and me.     Plan: The risks, benefits, expected outcome, and alternative to the recommended procedure have been discussed with the patient. Patient understands and wants to proceed with the procedure.     Electronically signed by Orin Nicole MD on 8/13/2024 at 7:38 AM

## 2024-08-13 NOTE — BRIEF OP NOTE
Brief Postoperative Note      Patient: Linda Bustamante  YOB: 1985  MRN: [id number]    Date of Procedure: 8/13/2024    Pre-Op Diagnosis Codes:      * Malignant neoplasm of right breast (HCC) [C50.911]    Post-Op Diagnosis: Same       Procedure(s):  Right breast mastectomy with sentinel lymph node biopsy  RIGHT BREAST IMPLANT/TISSUE EXPANDER INSERTION  fIRST STAGE RIGHT BREAST RECONSTRUCTION USING TISSUE EXPANDER AND ALLODERM    Surgeon(s):  Jack Juarez MD Marchand, Tiffany, MD    Assistant:  First Assistant: Tone Manuel PA  Resident: Orin Nicole MD    Anesthesia: General    Estimated Blood Loss (mL): less than 50     Complications: None    Specimens:   ID Type Source Tests Collected by Time Destination   A : right breast mastectomy   no chemo no radiation 324.5g Tissue Breast SURGICAL PATHOLOGY Samra Hill MD 8/13/2024 1007    B : sentinel lymph node #1 - blue 1113 Tissue Lymph Node SURGICAL PATHOLOGY Samra Hill MD 8/13/2024 1030    C : sentinel lymph node #2 - blue 311 Tissue Lymph Node SURGICAL PATHOLOGY Samra Hill MD 8/13/2024 1031        Implants:  Implant Name Type Inv. Item Serial No.  Lot No. LRB No. Used Action   GRAFT BNE SUB 5CC CA SULF STIMULAN RAP CURE - [id number]  GRAFT BNE SUB 5CC CA SULF STIMULAN RAP CURE  [employer] [id number] Right 1 Implanted   GRAFT HUM TISS 132 SQ CM THK2-2.8MM THCK M PERF CNTOUR ACELLULAR DERM - [id number]  GRAFT HUM TISS 132 SQ CM THK2-2.8MM THCK M PERF CNTOUR ACELLULAR DERM  [employer] [id number] Right 1 Implanted         Drains: * No LDAs found *    Findings:  Infection Present At Time Of Surgery (PATOS) (choose all levels that have infection present):  No infection present  Other Findings: none  [place] Node Biopsy for Breast Cancer - Right  Operation performed with curative intent. Yes   Tracer(s) used to identify sentinel nodes in the upfront surgery (non-neoadjuvant) setting

## 2024-08-13 NOTE — H&P
Department of Plastic Surgery - Adult  Attending Consult Note              CHIEF COMPLAINT:  History of right Breast Cancer     History Obtained From:  patient,      HISTORY OF PRESENT ILLNESS:                 The patient is a 38 y.o. female who presents with History of right Breast Cancer.  She is here today to discuss her options on breast reconstruction. The patient has met with Dr. Hill and has elected to proceed with  mastectomy.  She has no treatments of chemotherapy and will likely not be receiving radiation after mastectomy.  The patients Breast Cancer History is obtained from the patient and also notes from the care team.       Past Medical History:    Past Medical History        Past Medical History:   Diagnosis Date    Hypothyroidism      Invasive ductal carcinoma of right breast (HCC) 06/12/2024    Migraine      Seasonal allergies      Thyroid disease           Past Surgical History:    Past Surgical History         Past Surgical History:   Procedure Laterality Date    MARIO NEEDLE BIOPSY LYMPH NODE SUPERFICIAL   6/6/2024     MARIO NEEDLE BIOPSY LYMPH NODE SUPERFICIAL 6/6/2024 [facility]    MARIO US PERQ DEVICE SOFT TISSUE PLMT  FIRST LESION   6/6/2024     MARIO US PERQ DEVICE SOFT TISSUE PLMT  FIRST LESION 6/6/2024 [facility]    TONSILLECTOMY Bilateral      US BREAST BIOPSY W LOC DEVICE 1ST LESION RIGHT Right 6/6/2024     US BREAST BIOPSY W LOC DEVICE 1ST LESION RIGHT 6/6/2024 [facility]         Current Medications:   Current Hospital Medications   No current facility-administered medications for this visit.     Allergies:  Pcn [penicillins]     Social History:   Social History               Socioeconomic History    Marital status:        Spouse name: Not on file    Number of children: Not on file    Years of education: Not on file    Highest education level: Not on file   Occupational History    Not on file   Tobacco Use    Smoking status: Former       Current packs/day: 0.00          Photos were obtained. Chaperone present      Attestation    No change in patient's H & P. I have reviewed the procedure with the patient as well as the risk and benefits. They have no further questions and agree to proceed with surgery.    Jack Juarez MD   7:25 AM  8/13/2024

## 2024-08-13 NOTE — DISCHARGE INSTRUCTIONS
[facility] AMBULATORY PROCEDURE DISCHARGE INSTRUCTIONS  You may be drowsy or lightheaded after receiving sedation or anesthesia.    A responsible person should be with you for the next 24 hours.    Please follow the instructions checked below:    OK to remove large surgical bandages in 48hrs with shower. Please leave steri-strips in place until they fall off.    Please wear tight fitting bra day and night for 7 days    DIET INSTRUCTIONS:  [x]Start with light diet and progress to your normal diet as you feel like eating. If you experience nausea or repeated episodes of vomiting which persist beyond 12-24 hours, notify your doctor.  []Other     ACTIVITY INSTRUCTIONS:  [x]Rest today. Increase activity as tolerated    []Elevate operative limb   [x]No heavy lifting or strenuous activity     [x]No driving while on pain medication  []Other     WOUND/DRESSING INSTRUCTIONS:  Always ensure you and your care giver clean hands before and after caring for the wound.  [x]May shower 48 hrs from procedure   []May bathe      []Derma bond dressing-Do not apply lotion, gel, or liquid to wound while the derma bond is in place.   []Other         MEDICATION INSTRUCTIONS:    [x]Prescriptions sent with you.  Use as directed.  When taking pain medications, you may experience dizziness or drowsiness.  Do not drink alcohol or drive when taking these medications.  []You may take a non-prescription “headache remedy”, preferably one that does not contain aspirin.    Other Instructions:      FOLLOW-UP CARE:  [x]Call the office for follow-up appointment as previously scheduled    Call physician if they or any other problems occur:  Fever over 101°    Redness, swelling, hardness or warmth at the operative site  Unrelieved nausea    Foul smelling or cloudy drainage at the operative site   Unrelieved pain    Blood soaked dressing. (Some oozing may be normal)       Dr Juarez-    Discharge Home.   Call office to schedule a follow-up  appointment at [phone]  Please call to schedule an appointment to be seen In our office on Monday 8-19-24  Diet: regular diet  Activity: ambulate in house and no Strenuous exercise for 1 week    Shower/Bathing:  You can shower in 48 hours over the incision site.  At that time you can allow soap and water to rinse off the incision site while showering.  Once you are done in the shower you can pat dry the incision site with a clean paper towel.  No baths, hot tubs or soaking of the wound site at this time.     Dressings /Splint /Wound Care:Keep wound clean and dry.  There is no need to remove your steri strips.  Your surgical bra needs to be worn at all times.  Your bra may become saturated with drainage this is to be expected.  The bra can be off for period of time to have it washed and dried.  You can use gauze padding as needed for comfort under the surgical bra.  This gauze dressing can be changed as needed if the gauze becomes saturated.  At any point during the day if you notice any sudden changes to the appearance of the wound or the site operated on you will need to contact our office.  This may include opening of wound, pus, changes in size, color etc.      Drain Care  What is the drain for?  Your drain is to stop fluid from building up under the skin.  It also helps your incision to heal.  Your doctor will take the drain out when there is less and less fluid coming out.    What supplies do I need?  A cup for the fluid to be emptied into and to measure the fluid.  The chart to record the amount of fluid (if your surgeon tells you to do so).  4” x 4” gauze  Tape   Safety pin  Antibiotic ointment (available over the counter)    Drain care:  Wash you hands before you change the dressing or empty the drain. This is important to prevent infection.  Change the dressing to the drain tube site daily.  Apply a small amount of antibiotic ointment to the skin at the drain tube site with each dressing change.    How  do I empty the drain?  Hold the plastic bulb in an upright position with one hand and take the cap off with the other hand.  With the bulb in one hand and the cup in the other, turn the bulb over and place the end into the cup.  Squeeze gently and empty the fluid into the cup.  Squeeze the bulb tightly with one hand (to flatten it as much as possible) and recap it with the other hand.  This starts the suction again inside of the bulb.  Do not squeeze the bulb if the cap is on.  Record the amount of drainage if your physician has asked you to do so.  Discard the drainage into the toilet.  Rinse the cup so it is clean and ready to be used again the next time.  Wash your hands.  Re-pin the tape tab of the drain tube back to your clothing.  The bulb should always be lower than your incision.  This will prevent drainage from flowing back into the tubing and the incision.    How often do I need to empty my drain?  Usually you empty the drain when it is half full.  Most find they need to empty the drain 3 times a day- when you wake up in the morning, mid day, and before bed.  If the bulb fills up more than this, you may need to empty the drain more often.    How much drainage should there be?  The amount of drainage may vary from day to day.  It should be less each day.  If you increase your activity, it may increase the amount of drainage, temporarily.    What color should the drainage be?  The color will vary.  It may go from bright red to pink, then to clear yellow.    What do I do with my drainage record?  Take it to your follow up visit with your surgeon.    May I shower?  Yes, 48 hours post-operatively.  It may help to secure the drain to an old cloth/robe belt that is around your waist.     When should I call the doctor?  Call your doctor if:  You have an elevated temperature (greater than 101 or higher)  The drainage increases or stops suddenly  The drain stitches come loose or break, or if the drain comes out  The

## 2024-08-13 NOTE — ANESTHESIA POSTPROCEDURE EVALUATION
Department of Anesthesiology  Postprocedure Note    Patient: Linda Bustamante  MRN: [id number]  YOB: 1985  Date of evaluation: 8/13/2024    Procedure Summary       Date: 08/13/24 Room / Location: [address]    Anesthesia Start: 0845 Anesthesia Stop: 1200    Procedures:       Right breast mastectomy with sentinel lymph node biopsy (Right: Breast)      RIGHT BREAST IMPLANT/TISSUE EXPANDER INSERTION with expander, and alloderm   stage 1 (Right: Breast)      fIRST STAGE RIGHT BREAST RECONSTRUCTION USING TISSUE EXPANDER AND ALLODERM (Right: Breast) Diagnosis:       Malignant neoplasm of right breast (HCC)      (Malignant neoplasm of right breast (HCC) [C50.911])    Surgeons: Samra Hill MD; Jack Juarez MD Responsible Provider: Mohan Meredith MD    Anesthesia Type: general ASA Status: 2            Anesthesia Type: No value filed.    Sofia Phase I: Sofia Score: 10    Sofia Phase II:      Anesthesia Post Evaluation    Patient location during evaluation: PACU  Patient participation: complete - patient participated  Level of consciousness: awake and alert  Airway patency: patent  Nausea & Vomiting: no nausea and no vomiting  Cardiovascular status: hemodynamically stable  Respiratory status: acceptable  Hydration status: euvolemic  Multimodal analgesia pain management approach  Pain management: adequate    No notable events documented.

## 2024-08-13 NOTE — OP NOTE
Operative Note      Patient: Linda Bustamante  YOB: 1985  MRN: [id number]    Date of Procedure: 8/13/2024    Pre-Op Diagnosis Codes:      * Malignant neoplasm of right breast (HCC) [C50.911]    Post-Op Diagnosis: Same       Procedure(s):    FIRST STAGE RIGHT BREAST RECONSTRUCTION USING TISSUE EXPANDER AND ALLODERM    Surgeon(s):  Samra Hill MD Cash, Adam Daniel, MD    Assistant:   First Assistant: Tone Manuel PA  Resident: Orin Nicole MD    Anesthesia: General    Estimated Blood Loss (mL): 50cc    Complications: None    Specimens:   ID Type Source Tests Collected by Time Destination   A : right breast mastectomy   no chemo no radiation 324.5g Tissue Breast SURGICAL PATHOLOGY Samra Hill MD 8/13/2024 1007    B : sentinel lymph node #1 - blue 1113 Tissue Lymph Node SURGICAL PATHOLOGY Samra Hill MD 8/13/2024 1030    C : sentinel lymph node #2 - blue 311 Tissue Lymph Node SURGICAL PATHOLOGY Samra Hill MD 8/13/2024 1031        Implants:  Implant Name Type Inv. Item Serial No.  Lot No. LRB No. Used Action   GRAFT BNE SUB 5CC CA SULF STIMULAN RAP CURE - [id number]  GRAFT BNE SUB 5CC CA SULF STIMULAN RAP CURE  [employer] [id number] Right 1 Implanted   GRAFT HUM TISS 132 SQ CM THK2-2.8MM THCK M PERF CNTOUR ACELLULAR DERM - [id number]  GRAFT HUM TISS 132 SQ CM THK2-2.8MM THCK M PERF CNTOUR ACELLULAR DERM  [email or web address] [id number] Right 1 Implanted   EXPANDER BRST W12.7XH10.8CM P7CM 450CC MARY BUFFERZONE - [id number]  EXPANDER BRST W12.7XH10.8CM P7CM 450CC MARY BUFFERZONE  [employer]- [id number] Right 1 Implanted         Drains:   Closed/Suction Drain Inferior;Lateral;Right Breast Bulb (Active)   Site Description Clean, dry & intact 08/13/24 1313   Drainage Appearance Bloody 08/13/24 1313   Drain Status Compressed 08/13/24 1315   Output (ml) 15 ml 08/13/24 1313       Findings:  Infection Present At Time Of Surgery (PATOS) (choose all  levels that have infection present):  No infection present    Detailed Description of Procedure:            ASSISTANT:  Tone Manuel PA-C.  PA was required for the case due  to lack of adequately trained assistant; was involved in prepping,  draping, retracting, dressing and suturing.          IMPLANTS:    Right: [place]  cc Tissue Expander. Serial number: [id number]          PREOPERATIVE INDICATIONS:  The patient is a 38 y.o. female with history of right-sided breast cancer. The patient elected to proceed with right mastectomies with right unilateral first stage breast reconstruction, placement of tissue expander and AlloDerm.    Risks, benefits, and alternatives were explained to her including but not limited to bleeding, scarring, infection, asymmetries, implant Failure, anesthesia related complications, death, and need for further surgery.  She understood and elected to proceed.  She understands the nipple and areola would be removed. She was seen the day of surgery, marked, and all questions were answered.     OPERATIVE PROCEDURE:   When I entered the room, the mastectomy and sentinel lymph node biopsies were complete.  Mastectomy flaps appeared viable with good bleeding dermal edges.  The first stage reconstruction commenced.      Dissection was carried in the retro-pectoral plane medially to 1.5 cm from midline, cephalically to just below the clavicle.  The muscle was then disinserted on its inferior aspect to allow for cephalic reflection of the muscle.  The wound was irrigated copiously with normal saline.  Visual inspection of the capsule yielded no suspicious lesions.  AlloDerm was soaked for greater than 3 minutes in normal saline of the contour fenestrated type, then wash in sterile Betadine followed by wash in sterile saline of 3 liters.  In meanwhile, antibiotic beads of the Stimulan system were also created consisting of vancomycin and gentamicin and these were set aside.  The AlloDerm was

## 2024-08-13 NOTE — H&P (VIEW-ONLY)
[facility]  HISTORY & PHYSICAL  ATTENDING NOTE     Patient's Name/Date of Birth: Linda Bustamante / 1985     Date: 2024           Chief Complaint   Patient presents with    Breast Cancer       Right breast Invasive Ductal carcinoma-patient found lump herself  - imaging [facility] [address] / Biopsy [facility]          Linda Bustamante presents for evaluation of a mammographic abnormality.     PCP: Yolis Chandler MD. Gynecologist: Martin.     Referred by:  Dr. Chandler     The mammogram was performed at [facility] on 2024. The patient has not noted a change in BSE since presentation.  Patient denies nipple discharge. Patient denies a personal history of breast cancer.  Breast cancer risk factors include family hx on father's side and gender.  Ashkenazi [place]anda[place] Ancestry: No.     [place] on may 21     OBSTETRIC RELATED HISTORY:  Age of menarche was 13.   Age of menopause was N/a    Patient denies hormonal therapy.   Patient is .   Age of first live birth was 14.   Patient did not breast feed.  Is patient interested in fertility information about fertility preservation? No     CANCER SURVEILLANCE HISTORY:  Mammograms: No   Breast MRI's: No   Breast Biopsies: No   Colonoscopy: No   GI Polyps: Not Applicable   EGD: No   Pelvic Exam: Yes   Pap Smear: Yes   Dermatology: No   Lung screening: no  H/O DVT:  no  H/O Radiation:  no     Children ar 15y to 23y           Estimated body mass index is 23.59 kg/m² as calculated from the following:    Height as of this encounter: 1.575 m (5' 2\").    Weight as of this encounter: 58.5 kg (129 lb).  Bra Size: 36B     Because violence is so common, we ask all our patients: are you in a relationship or do you live with a person who threatens, hurts, or controls you:  no     Patient drinks significant caffeinated beverages. Patient does not smoke cigarettes. Patient does not use recreational drugs.        Past Medical History        Past Medical  considered at this time.     ASSESSMENT/PLAN:  Right breast cancer--Stage IB, ER/VT+ HER2-, grade 2  --CBC, CMP  --genetic testing  --MRI breast due to breast density  -- I discussed lumpectomy with sentinel lymph node biopsy.  I explained to her that a lumpectomy versus mastectomy with following all the steps of taking tamoxifen and having radiation have similar outcomes.  I explained that as long as the MRI does not have any surprises that she would be a candidate for a lumpectomy with sentinel lymph node biopsy.  I explained how the procedure is done.  I went over the risk, benefits, alternatives procedure including but not limited to bleeding, infection, need for second surgery for margins, need for axillary lymph node dissection.  She understands and wishes to proceed.     Clinical Staging:         - IB        - Discussed with patient.  National Comprehensive Cancer Network (NCCN) Guidelines Reviewed with Patient:Yes   All available patient images were reviewed  Outside slide review by pathologist initiated   Genetic testing:    - Candidate: Yes   - Counseled: Yes   - Performed: Yes  Metastatic workup:  - CBC  - CMP  - Chest X-ray  Functional assessment:  - Arm Abduction: Full  Plastic/Reconstructive Surgery Referral: Not applicable  Other Referrals:  - None           Vascular access requirements: Not necessary at this time  Social Service Needs:    - None  Patient Education:   - Risks, Benefits, Expected Outcome, and Alternatives to lumpectomy and sentinel lymph node biopsy were explained  Individualized shared decision making: Yes  Patient and her  were given the opportunity to fully ask any questions that they may have had.  They asked some very good questions about outcomes and what to expect.  They appear to fully understand the procedure and the process.   MRI BREAST:  Narrative & Impression  EXAMINATION:  MRI OF THE BILATERAL BREASTS WITHOUT AND WITH CONTRAST 6/25/2024 12:54 pm:

## 2024-08-13 NOTE — ANESTHESIA PROCEDURE NOTES
Peripheral Block    Patient location during procedure: pre-op  Reason for block: post-op pain management and at surgeon's request  Start time: 8/13/2024 8:38 AM  Staffing  Performed: anesthesiologist   Anesthesiologist: Mohan Meredith MD  Performed by: Mohan Meredith MD  Authorized by: Mohan Meredith MD    Preanesthetic Checklist  Completed: patient identified, IV checked, site marked, risks and benefits discussed, surgical/procedural consents, equipment checked, pre-op evaluation, timeout performed, anesthesia consent given, oxygen available and monitors applied/VS acknowledged  Peripheral Block   Patient position: sitting  Prep: ChloraPrep  Provider prep: mask and sterile gloves  Patient monitoring: cardiac monitor, continuous pulse ox, frequent blood pressure checks and IV access  Block type: PECS I and PECS II  Laterality: right  Injection technique: single-shot  Guidance: ultrasound guided  Local infiltration: lidocaine  Infiltration strength: 1 %  Local infiltration: lidocaine  Dose: 1 mL    Needle   Needle type: insulated echogenic nerve stimulator needle   Needle gauge: 22 G  Needle localization: ultrasound guidance  Needle length: 10 cm  Assessment   Injection assessment: negative aspiration for heme, no paresthesia on injection, local visualized surrounding nerve on ultrasound and no intravascular symptoms  Paresthesia pain: none  Slow fractionated injection: yes  Hemodynamics: stable  Outcomes: uncomplicated and patient tolerated procedure well    Medications Administered  BUPivacaine (MARCAINE) PF injection 0.5% - Perineural, Breast Right   30 mL - 8/13/2024 8:38:00 AM

## 2024-08-13 NOTE — OP NOTE
[facility]  OPERATIVE REPORT    Linda Bustamante  1985      DATE OF PROCEDURE: 8/13/2024     SURGEON: Samra Hlil MD, MSc, FACS     ASSISTANT: YENIFER Nicole MD, PGY-IV     PREOPERATIVE DIAGNOSIS:  right breast cancer, Stage IB, ER/HI+ HER2-, grade 2.     POSTOPERATIVE DIAGNOSIS: Same    OPERATION: Right sentinel lymph node injection, right methylene blue dye injection, right simple mastectomy with sentinel lymph node biopsy     ANESTHESIA: General     ESTIMATED BLOOD LOSS: 30 cc     COMPLICATIONS: None    SPECIMEN: Right breast, sentinel lymph node #1 (blue, #1113), sentinel lymph node #2 (blue, #311)    DRAINS: None for my portion of the case    HISTORY:  This is a 38 y.o.female with a history of a palpable right breast mass.  She underwent MRI which showed a 3 cm mass with possible satellite lesions.  The skin was preserved.  The nipple areolar complex was preserved.  She was counseled on mastectomy with sentinel lymph node biopsy.  She wishes to undergo reconstruction so Dr. Juarez will perform for stage reconstruction today.    DESCRIPTION OF PROCEDURE: The patient was identified and the procedure was confirmed.  I met the patient in the preoperative suite and injected her with Lymphoseek.  She was then brought in the operating table placed supine the operating table after adequate anesthesia she was prepped and draped standard surgical fashion.  Ancef 2 g IV was given, bilateral SCDs were in place, lower Elio hugger applied.  We injected 5 cc of full-strength methylene blue dye in the retroareolar space.  We massaged breast for 5 minutes.  We then used the PEAK PlasmaBlade to make her incision.  We dissected the breast out to the clavicle superiorly, the sternum medially, the inframammary fold inferiorly down to the rectus abdominis.  And then out laterally to the axilla.  Remove the breast off the pectoralis major muscle taking the fascia with us.  Once the breast was removed I  marked on the back table using 6 colors of paint using vector marking kit.  We then opened the clavipectoral fascia and entered the axilla.  Identified a blue lymph node.  I grasped with a Luan clamp and dissected it free with the harmonic scalpel.  It then had a count of 1113.  Identified a second lymph node higher up in the axilla towards the axillary vein crisis with a Luan like initially and then transition to an Allis clamp.  It appeared like there was 2 small lymph nodes we dissected this out and had a count of 311 and were blue.  Looked in the axilla again did not find any further blue lymph nodes, no abnormal palpable lymph nodes, no further counts.  We then irrigated copiously with saline.  I placed moist wet lap and the wound hemostasis had been achieved.  Case was then turned over to Dr. Juarez.    Needle, sponge, and instrument counts were reported as correct x2. The patient tolerated the procedure and was transferred to the recovery area in satisfactory condition.     was updated at the end of the case.    Samra Hill MD, MSc, FACS  8/13/2024  11:07 AM

## 2024-08-13 NOTE — PROGRESS NOTES
Pt verified using 2 Identifiers and ID band with OR staff prior to acceptance to PACU  Patient to PACU & placed on appropriate monitors.   Cart low, locked with siderails up.

## 2024-08-13 NOTE — PROGRESS NOTES
CLINICAL PHARMACY NOTE: MEDS TO BEDS    Total # of Prescriptions Filled: 3   The following medications were delivered to the patient:  OXYCODONE-APAP 5-325  Clindamycin 300  Ondansetron 4     Additional Documentation:  Thiago ga picked up

## 2024-08-13 NOTE — ANESTHESIA PRE PROCEDURE
Department of Anesthesiology  Preprocedure Note       Name:  Linda Bustamante   Age:  38 y.o.  :  1985                                          MRN:  [id number]         Date:  2024      Surgeon: Surgeon(s):  Samra Hill MD Cash, Adam Daniel, MD    Procedure: Procedure(s):  Right breast mastectomy with sentinel lymph node biopsy NEEDS RIGHT PECTORAL NERVE BLOCK, METHYLENE BLUE DYE, VECTOR PAINT KIT, PLASMABLADE, NEOPROBE  RIGHT BREAST IMPLANT/TISSUE EXPANDER INSERTION  fIRST STAGE RIGHT BREAST RECONSTRUCTION USING TISSUE EXPANDER AND ALLODERM    Medications prior to admission:   Prior to Admission medications    Medication Sig Start Date End Date Taking? Authorizing Provider   fluticasone (FLONASE) 50 MCG/ACT nasal spray 1 spray by Nasal route daily 24  Yes Julia Amaya MD   zonisamide (ZONEGRAN) 100 MG capsule  23  Yes Julia Amaya MD   levothyroxine (SYNTHROID) 112 MCG tablet Take 1 tablet by mouth daily   Yes Julia Amaya MD   cetirizine (ZYRTEC) 10 MG tablet Take 1 tablet by mouth daily   Yes Julia Amaya MD   Montelukast Sodium (SINGULAIR PO) Take by mouth  Patient not taking: Reported on 2024    ProviderJulia MD       Current medications:    Current Facility-Administered Medications   Medication Dose Route Frequency Provider Last Rate Last Admin    0.9 % sodium chloride infusion   IntraVENous Continuous Samra Hill MD        sodium chloride flush 0.9 % injection 5-40 mL  5-40 mL IntraVENous 2 times per day Samra Hill MD        sodium chloride flush 0.9 % injection 5-40 mL  5-40 mL IntraVENous PRN Samra Hill MD        0.9 % sodium chloride infusion   IntraVENous PRN Samra Hill MD        ceFAZolin (ANCEF) 2,000 mg in sterile water 20 mL IV syringe  2,000 mg IntraVENous On Call to OR Samra Hill MD           Allergies:    Allergies   Allergen Reactions    Pcn [Penicillins]      Reaction unknown

## 2024-08-19 ENCOUNTER — OFFICE VISIT (OUTPATIENT)
Dept: SURGERY | Age: 39
End: 2024-08-19

## 2024-08-19 VITALS — TEMPERATURE: 97.7 F

## 2024-08-19 DIAGNOSIS — Z98.890 S/P BREAST RECONSTRUCTION: ICD-10-CM

## 2024-08-19 DIAGNOSIS — C50.911 INVASIVE DUCTAL CARCINOMA OF BREAST, FEMALE, RIGHT (HCC): Primary | ICD-10-CM

## 2024-08-19 PROCEDURE — 99024 POSTOP FOLLOW-UP VISIT: CPT | Performed by: PHYSICIAN ASSISTANT

## 2024-08-19 NOTE — PROGRESS NOTES
Subjective:    Follow up today from  Right breast mastectomy with sentinel lymph node biopsy - Right, RIGHT BREAST IMPLANT/TISSUE EXPANDER INSERTION with expander, and alloderm   stage 1 - Right, and fIRST STAGE RIGHT BREAST RECONSTRUCTION USING TISSUE EXPANDER AND ALLODERM - Right . Denies fever, nausea, vomiting, leg pain or swelling, pain is mild.  The pt states she is  taking her antibiotic and continues to wear her surgical bra.   She voices no complaints with NICOLE drain management.  She is ambulating at home.  She is  wearing her surgical bra at all times.  She is recording her NICOLE drain output and does  present today with a list of the output volume.      Objective:    Temp 97.7 °F (36.5 °C) (Temporal)   LMP 08/07/2024 (Exact Date)       Left Breast- Clean, dry, and intact, no drainage.  no signs of infection.  NICOLE drain intact without signs of infection, output is appropriate color and volume. >30cc/24 hours  Steri strips intact,    Right Breast- Clean, dry, and intact, no drainage. Steri strips intact, no signs of infection.   NICOLE drain intact without signs of infection, output is appropriate color and volume. >30cc/24 hours  Steri strips intact,    Assessment:    Patient Active Problem List   Diagnosis    Invasive ductal carcinoma of breast, female, right (HCC)    Malignant neoplasm of right breast (HCC)    Pre-operative laboratory examination       Pathology- pending    Plan:     Status post first stage breast reconstruction    -Continue wearing surgical bra at all times  -Continue to record NICOLE drain output every 8 hours  -Educated the pt that her pain is proportionate to the amount of surgery she had and she may begin using Ibuprofen in addition to pain medication.    -ABX to completion  -Pain control PRN  -No driving while taking narcotic pain medication or while impaired second to limited UE ROM  -No heavy lifting at this time  -OK to shower at this time.  Advised the patient that they can allow soap and

## 2024-08-21 ENCOUNTER — TELEPHONE (OUTPATIENT)
Dept: BREAST CENTER | Age: 39
End: 2024-08-21

## 2024-08-21 ENCOUNTER — TELEPHONE (OUTPATIENT)
Dept: SURGERY | Age: 39
End: 2024-08-21

## 2024-08-21 LAB — SURGICAL PATHOLOGY REPORT: NORMAL

## 2024-08-21 NOTE — TELEPHONE ENCOUNTER
RN submitted Oncotype RX and scanned RX and confirmation was received under media tab in chart. Once results are finalized will scan into patient chart.     Electronically signed by Ruba Brothers RN on 8/21/24 at 2:10 PM EDT

## 2024-08-22 ENCOUNTER — TELEPHONE (OUTPATIENT)
Dept: BREAST CENTER | Age: 39
End: 2024-08-22

## 2024-08-22 NOTE — TELEPHONE ENCOUNTER
JOHAN Brothers contacted [facility] for prior authorization of outpatient lab test. A prior authorization needed for Oncotype [id number]). RN Spoke with Annelise, who advised prior auth had to be submitted online or via fax. RN filled out form and faxed to [facility] at [phone]. RN scanned authorization form in media tab.    Electronically signed by Ruba Brothers RN on 8/22/24 at 3:00 PM EDT

## 2024-08-26 ENCOUNTER — OFFICE VISIT (OUTPATIENT)
Dept: SURGERY | Age: 39
End: 2024-08-26

## 2024-08-26 DIAGNOSIS — Z98.890 S/P BREAST RECONSTRUCTION: ICD-10-CM

## 2024-08-26 DIAGNOSIS — C50.911 INVASIVE DUCTAL CARCINOMA OF BREAST, FEMALE, RIGHT (HCC): Primary | ICD-10-CM

## 2024-08-26 PROCEDURE — 99024 POSTOP FOLLOW-UP VISIT: CPT | Performed by: PHYSICIAN ASSISTANT

## 2024-08-26 NOTE — PROGRESS NOTES
Subjective:    Follow up today from  Right breast mastectomy with sentinel lymph node biopsy - Right, RIGHT BREAST IMPLANT/TISSUE EXPANDER INSERTION with expander, and alloderm   stage 1 - Right, and fIRST STAGE RIGHT BREAST RECONSTRUCTION USING TISSUE EXPANDER AND ALLODERM - Right . Denies fever, nausea, vomiting, leg pain or swelling, pain is mild.  The pt states she is  taking her antibiotic and continues to wear her surgical bra.   She voices no complaints with NICOLE drain management.  She is ambulating at home.  She is  wearing her surgical bra at all times.  She is recording her NICOLE drain output and does  present today with a list of the output volume.  She presents today with her .  She did speak with Dr. Hill over the phone regarding her pathology results    Objective:    LMP 08/07/2024 (Exact Date)         Right Breast- Clean, dry, and intact, no drainage. Steri strips intact, no signs of infection.   NICOLE drain intact without signs of infection, output is appropriate color and volume. >30cc/24 hours  Steri strips intact,    Assessment:    Patient Active Problem List   Diagnosis    Invasive ductal carcinoma of breast, female, right (HCC)    Malignant neoplasm of right breast (HCC)    Pre-operative laboratory examination       Pathology-    Pathology Report Path Number: [id number]    -- Diagnosis --  A.          Right Breast Mastectomy:         INVASIVE, DUCTAL CARCINOMA OF BREAST, 2.8 x 2.1 x 2.0 cm, GRADE  2, with microcalcification and lymphovascular invasion (see synoptic  checklist).    DUCTAL CARCINOMA IN SITU, NUCLEAR GRADE 2/3, cribriform and solid  types, with focal central necrosis.    Prior biopsy cavity is identified.    Margins of excision are negative for DCIS or malignancy.    Fibrocystic changes and sclerosing adenosis are seen.     B.          [place] Lymph Node # 1:         One lymph node, negative for metastasis (0/1).     C.          [place] Lymph Node # 2:         One lymph node,

## 2024-08-27 NOTE — PROGRESS NOTES
Subjective:    Follow up today from  Right breast mastectomy with sentinel lymph node biopsy - Right, RIGHT BREAST IMPLANT/TISSUE EXPANDER INSERTION with expander, and alloderm   stage 1 - Right, and fIRST STAGE RIGHT BREAST RECONSTRUCTION USING TISSUE EXPANDER AND ALLODERM - Right . Denies fever, nausea, vomiting, leg pain or swelling, pain is mild.  The pt states she is  taking her antibiotic and continues to wear her surgical bra.   She voices no complaints with NICOLE drain management.  She is ambulating at home.  She is  wearing her surgical bra at all times.  She is recording her NICOLE drain output and does  present today with a list of the output volume.  She presents today with her .      Objective:    LMP 08/07/2024 (Exact Date)         Right Breast- Clean, dry, and intact, no drainage. Steri strips intact, no signs of infection.   NICOLE drain intact without signs of infection, output is appropriate color and volume. <30cc/24 hours  Steri strips intact,    Assessment:    Patient Active Problem List   Diagnosis    Invasive ductal carcinoma of breast, female, right (HCC)    Malignant neoplasm of right breast (HCC)    Pre-operative laboratory examination       Pathology-    Pathology Report Path Number: [id number]    -- Diagnosis --  A.          Right Breast Mastectomy:         INVASIVE, DUCTAL CARCINOMA OF BREAST, 2.8 x 2.1 x 2.0 cm, GRADE  2, with microcalcification and lymphovascular invasion (see synoptic  checklist).    DUCTAL CARCINOMA IN SITU, NUCLEAR GRADE 2/3, cribriform and solid  types, with focal central necrosis.    Prior biopsy cavity is identified.    Margins of excision are negative for DCIS or malignancy.    Fibrocystic changes and sclerosing adenosis are seen.     B.          [place] Lymph Node # 1:         One lymph node, negative for metastasis (0/1).     C.          [place] Lymph Node # 2:         One lymph node, negative for metastasis (0/1).    Comment:  Intradepartmental consultation is  placed. Patient tolerated well.       Right Postfill- 275ml    F/U in 1 week.    Call office with concerns or signs of infection.    KATALINA Jennings

## 2024-08-27 NOTE — TELEPHONE ENCOUNTER
RN called [facility] and spoke to [facility]  to check status on prior authorization. [facility] states it has been approved from 08/22/2024-11/22/2024, authorization number [id number].  RN faxing information to [employer].      Electronically signed by Ruba Brothers RN on 8/27/24 at 10:55 AM EDT

## 2024-08-30 ENCOUNTER — OFFICE VISIT (OUTPATIENT)
Dept: SURGERY | Age: 39
End: 2024-08-30

## 2024-08-30 ENCOUNTER — OFFICE VISIT (OUTPATIENT)
Dept: BREAST CENTER | Age: 39
End: 2024-08-30

## 2024-08-30 ENCOUNTER — TELEPHONE (OUTPATIENT)
Dept: BREAST CENTER | Age: 39
End: 2024-08-30

## 2024-08-30 VITALS — TEMPERATURE: 97.3 F

## 2024-08-30 VITALS
HEIGHT: 62 IN | HEART RATE: 66 BPM | TEMPERATURE: 98.2 F | RESPIRATION RATE: 16 BRPM | OXYGEN SATURATION: 100 % | BODY MASS INDEX: 23.19 KG/M2 | WEIGHT: 126 LBS | DIASTOLIC BLOOD PRESSURE: 62 MMHG | SYSTOLIC BLOOD PRESSURE: 112 MMHG

## 2024-08-30 DIAGNOSIS — C50.111 MALIGNANT NEOPLASM OF CENTRAL PORTION OF RIGHT BREAST IN FEMALE, ESTROGEN RECEPTOR POSITIVE (HCC): Primary | ICD-10-CM

## 2024-08-30 DIAGNOSIS — Z17.0 MALIGNANT NEOPLASM OF CENTRAL PORTION OF RIGHT BREAST IN FEMALE, ESTROGEN RECEPTOR POSITIVE (HCC): Primary | ICD-10-CM

## 2024-08-30 DIAGNOSIS — C50.911 INVASIVE DUCTAL CARCINOMA OF BREAST, FEMALE, RIGHT (HCC): ICD-10-CM

## 2024-08-30 DIAGNOSIS — Z98.890 S/P BREAST RECONSTRUCTION: ICD-10-CM

## 2024-08-30 DIAGNOSIS — C50.911 INVASIVE DUCTAL CARCINOMA OF BREAST, FEMALE, RIGHT (HCC): Primary | ICD-10-CM

## 2024-08-30 LAB — SURGICAL PATHOLOGY REPORT: NORMAL

## 2024-08-30 PROCEDURE — 99024 POSTOP FOLLOW-UP VISIT: CPT | Performed by: SURGERY

## 2024-08-30 PROCEDURE — 99024 POSTOP FOLLOW-UP VISIT: CPT | Performed by: PHYSICIAN ASSISTANT

## 2024-08-30 NOTE — PROGRESS NOTES
[facility]  PROGRESS NOTE  ATTENDING NOTE    Chief Complaint   Patient presents with    Post-Op Check     P/O Right mastectomy SLNB/1st stage recon with  DOS 8/13/24     S:  39y/o F s/p right mastectomy with SLNBx.  She is doing well.  She has limited ROM of RUE, but still has drain in place and was recently expanded. She states she feels a little pull in the axilla.  She denies paresthesias.  She has an appointment today with Dr. Juarez.      I reviewed her pathology report and oncotype Dx with her.  I explained that given her age, there may be a consideration for chemotherapy.      /62 (Site: Left Upper Arm)   Pulse 66   Temp 98.2 °F (36.8 °C) (Temporal)   Resp 16   Ht 1.575 m (5' 2\")   Wt 57.2 kg (126 lb)   LMP 08/07/2024 (Exact Date)   SpO2 100%   BMI 23.05 kg/m²   Gen:  NAD  Right axilla:  no swelling  Right breast:  steristrips in place, c/d/I    ASSESSMENT/PLAN:  Right breast cancer--Stage IA--ER/NC+ HER2-, oncotype Dx 20  --referral to oncology  --I reviewed exercises with her, but advised she get approval from plastics before pursuing.  --Advised her to call if the shoulder function does not improve in the next month for referral to PT/OT  --RTC 6 months; mammogram June 2025    Samra Hill MD, MSc, FACS  8/30/2024  8:32 AM

## 2024-08-30 NOTE — TELEPHONE ENCOUNTER
"Mose[employer], Mobile Secretary" Oncotype Dx Breast Recurrence Score Report: 21     Scanned to patient's chart and routed results to Dr. Hill(breast surgeon).      Electronically signed by Ruba Brothers RN on 8/30/24 at 7:28 AM EDT

## 2024-09-09 ENCOUNTER — OFFICE VISIT (OUTPATIENT)
Dept: SURGERY | Age: 39
End: 2024-09-09

## 2024-09-09 VITALS — TEMPERATURE: 97.2 F

## 2024-09-09 DIAGNOSIS — Z98.890 S/P BREAST RECONSTRUCTION: ICD-10-CM

## 2024-09-09 DIAGNOSIS — C50.911 INVASIVE DUCTAL CARCINOMA OF BREAST, FEMALE, RIGHT (HCC): Primary | ICD-10-CM

## 2024-09-09 PROCEDURE — 99024 POSTOP FOLLOW-UP VISIT: CPT | Performed by: PHYSICIAN ASSISTANT

## 2024-09-12 ENCOUNTER — TELEPHONE (OUTPATIENT)
Dept: CASE MANAGEMENT | Age: 39
End: 2024-09-12

## 2024-09-12 ENCOUNTER — HOSPITAL ENCOUNTER (OUTPATIENT)
Dept: INFUSION THERAPY | Age: 39
Discharge: HOME | End: 2024-09-12

## 2024-09-12 ENCOUNTER — OFFICE VISIT (OUTPATIENT)
Dept: ONCOLOGY | Age: 39
End: 2024-09-12
Payer: COMMERCIAL

## 2024-09-12 VITALS
WEIGHT: 128.9 LBS | DIASTOLIC BLOOD PRESSURE: 76 MMHG | HEART RATE: 86 BPM | TEMPERATURE: 98.2 F | OXYGEN SATURATION: 99 % | SYSTOLIC BLOOD PRESSURE: 124 MMHG | BODY MASS INDEX: 23.58 KG/M2

## 2024-09-12 DIAGNOSIS — C50.911 INVASIVE DUCTAL CARCINOMA OF BREAST, FEMALE, RIGHT (HCC): Primary | ICD-10-CM

## 2024-09-12 PROBLEM — Z01.812 PRE-OPERATIVE LABORATORY EXAMINATION: Status: RESOLVED | Noted: 2024-08-13 | Resolved: 2024-09-12

## 2024-09-12 PROCEDURE — G8427 DOCREV CUR MEDS BY ELIG CLIN: HCPCS | Performed by: STUDENT IN AN ORGANIZED HEALTH CARE EDUCATION/TRAINING PROGRAM

## 2024-09-12 PROCEDURE — 1036F TOBACCO NON-USER: CPT | Performed by: STUDENT IN AN ORGANIZED HEALTH CARE EDUCATION/TRAINING PROGRAM

## 2024-09-12 PROCEDURE — G8420 CALC BMI NORM PARAMETERS: HCPCS | Performed by: STUDENT IN AN ORGANIZED HEALTH CARE EDUCATION/TRAINING PROGRAM

## 2024-09-12 PROCEDURE — 99204 OFFICE O/P NEW MOD 45 MIN: CPT | Performed by: STUDENT IN AN ORGANIZED HEALTH CARE EDUCATION/TRAINING PROGRAM

## 2024-09-16 ENCOUNTER — TELEPHONE (OUTPATIENT)
Dept: CASE MANAGEMENT | Age: 39
End: 2024-09-16

## 2024-09-19 ENCOUNTER — HOSPITAL ENCOUNTER (OUTPATIENT)
Dept: INFUSION THERAPY | Age: 39
End: 2024-09-19

## 2024-09-23 DIAGNOSIS — C50.111 MALIGNANT NEOPLASM OF CENTRAL PORTION OF RIGHT BREAST IN FEMALE, ESTROGEN RECEPTOR POSITIVE (HCC): Primary | ICD-10-CM

## 2024-09-23 DIAGNOSIS — Z17.0 MALIGNANT NEOPLASM OF CENTRAL PORTION OF RIGHT BREAST IN FEMALE, ESTROGEN RECEPTOR POSITIVE (HCC): Primary | ICD-10-CM

## 2024-09-25 ENCOUNTER — HOSPITAL ENCOUNTER (OUTPATIENT)
Dept: INFUSION THERAPY | Age: 39
Discharge: HOME | End: 2024-09-25
Payer: COMMERCIAL

## 2024-09-25 DIAGNOSIS — Z17.0 MALIGNANT NEOPLASM OF CENTRAL PORTION OF RIGHT BREAST IN FEMALE, ESTROGEN RECEPTOR POSITIVE (HCC): ICD-10-CM

## 2024-09-25 DIAGNOSIS — C50.111 MALIGNANT NEOPLASM OF CENTRAL PORTION OF RIGHT BREAST IN FEMALE, ESTROGEN RECEPTOR POSITIVE (HCC): ICD-10-CM

## 2024-09-25 LAB
ALBUMIN SERPL-MCNC: 4.4 G/DL (ref 3.5–5.2)
ALP SERPL-CCNC: 91 U/L (ref 35–104)
ALT SERPL-CCNC: <5 U/L (ref 0–32)
ANION GAP SERPL CALCULATED.3IONS-SCNC: 12 MMOL/L (ref 7–16)
AST SERPL-CCNC: 14 U/L (ref 0–31)
BASOPHILS # BLD: 0.06 K/UL (ref 0–0.2)
BASOPHILS NFR BLD: 1 % (ref 0–2)
BILIRUB SERPL-MCNC: 0.3 MG/DL (ref 0–1.2)
BUN SERPL-MCNC: 13 MG/DL (ref 6–20)
CALCIUM SERPL-MCNC: 9.2 MG/DL (ref 8.6–10.2)
CHLORIDE SERPL-SCNC: 106 MMOL/L (ref 98–107)
CO2 SERPL-SCNC: 21 MMOL/L (ref 22–29)
CREAT SERPL-MCNC: 0.7 MG/DL (ref 0.5–1)
EOSINOPHIL # BLD: 0.1 K/UL (ref 0.05–0.5)
EOSINOPHILS RELATIVE PERCENT: 1 % (ref 0–6)
ERYTHROCYTE [DISTWIDTH] IN BLOOD BY AUTOMATED COUNT: 12.6 % (ref 11.5–15)
GFR, ESTIMATED: >90 ML/MIN/1.73M2
GLUCOSE SERPL-MCNC: 92 MG/DL (ref 74–99)
HCT VFR BLD AUTO: 41.7 % (ref 34–48)
HGB BLD-MCNC: 13.9 G/DL (ref 11.5–15.5)
IMM GRANULOCYTES # BLD AUTO: <0.03 K/UL (ref 0–0.58)
IMM GRANULOCYTES NFR BLD: 0 % (ref 0–5)
LYMPHOCYTES NFR BLD: 2.59 K/UL (ref 1.5–4)
LYMPHOCYTES RELATIVE PERCENT: 37 % (ref 20–42)
MCH RBC QN AUTO: 31.7 PG (ref 26–35)
MCHC RBC AUTO-ENTMCNC: 33.3 G/DL (ref 32–34.5)
MCV RBC AUTO: 95.2 FL (ref 80–99.9)
MONOCYTES NFR BLD: 0.32 K/UL (ref 0.1–0.95)
MONOCYTES NFR BLD: 5 % (ref 2–12)
NEUTROPHILS NFR BLD: 55 % (ref 43–80)
NEUTS SEG NFR BLD: 3.83 K/UL (ref 1.8–7.3)
PLATELET # BLD AUTO: 248 K/UL (ref 130–450)
PMV BLD AUTO: 10.6 FL (ref 7–12)
POTASSIUM SERPL-SCNC: 4 MMOL/L (ref 3.5–5)
PROT SERPL-MCNC: 6.9 G/DL (ref 6.4–8.3)
RBC # BLD AUTO: 4.38 M/UL (ref 3.5–5.5)
SODIUM SERPL-SCNC: 139 MMOL/L (ref 132–146)
WBC OTHER # BLD: 6.9 K/UL (ref 4.5–11.5)

## 2024-09-25 PROCEDURE — 36415 COLL VENOUS BLD VENIPUNCTURE: CPT

## 2024-09-25 PROCEDURE — 80053 COMPREHEN METABOLIC PANEL: CPT

## 2024-09-25 PROCEDURE — 85025 COMPLETE CBC W/AUTO DIFF WBC: CPT

## 2024-09-25 PROCEDURE — 99214 OFFICE O/P EST MOD 30 MIN: CPT

## 2024-09-25 RX ORDER — PROCHLORPERAZINE MALEATE 10 MG
10 TABLET ORAL EVERY 6 HOURS PRN
Qty: 30 TABLET | Refills: 0 | Status: SHIPPED
Start: 2024-09-25 | End: 2024-09-25 | Stop reason: SDUPTHER

## 2024-09-25 RX ORDER — MEPERIDINE HYDROCHLORIDE 50 MG/ML
12.5 INJECTION INTRAMUSCULAR; INTRAVENOUS; SUBCUTANEOUS PRN
Status: CANCELLED | OUTPATIENT
Start: 2024-09-26

## 2024-09-25 RX ORDER — SODIUM CHLORIDE 0.9 % (FLUSH) 0.9 %
5-40 SYRINGE (ML) INJECTION PRN
Status: CANCELLED | OUTPATIENT
Start: 2024-09-26

## 2024-09-25 RX ORDER — ONDANSETRON 8 MG/1
8 TABLET, ORALLY DISINTEGRATING ORAL EVERY 8 HOURS PRN
Qty: 28 TABLET | Refills: 1 | Status: SHIPPED | OUTPATIENT
Start: 2024-09-25

## 2024-09-25 RX ORDER — PALONOSETRON 0.05 MG/ML
0.25 INJECTION, SOLUTION INTRAVENOUS ONCE
Status: CANCELLED | OUTPATIENT
Start: 2024-09-26 | End: 2024-09-26

## 2024-09-25 RX ORDER — DIPHENHYDRAMINE HYDROCHLORIDE 50 MG/ML
50 INJECTION INTRAMUSCULAR; INTRAVENOUS
Status: CANCELLED | OUTPATIENT
Start: 2024-09-26

## 2024-09-25 RX ORDER — ALBUTEROL SULFATE 90 UG/1
4 INHALANT RESPIRATORY (INHALATION) PRN
Status: CANCELLED | OUTPATIENT
Start: 2024-09-26

## 2024-09-25 RX ORDER — ONDANSETRON 2 MG/ML
8 INJECTION INTRAMUSCULAR; INTRAVENOUS
Status: CANCELLED | OUTPATIENT
Start: 2024-09-26

## 2024-09-25 RX ORDER — ONDANSETRON 8 MG/1
8 TABLET, ORALLY DISINTEGRATING ORAL EVERY 8 HOURS PRN
Qty: 28 TABLET | Refills: 1 | Status: SHIPPED
Start: 2024-09-25 | End: 2024-09-25 | Stop reason: SDUPTHER

## 2024-09-25 RX ORDER — HEPARIN SODIUM (PORCINE) LOCK FLUSH IV SOLN 100 UNIT/ML 100 UNIT/ML
500 SOLUTION INTRAVENOUS PRN
Status: CANCELLED | OUTPATIENT
Start: 2024-09-26

## 2024-09-25 RX ORDER — ACETAMINOPHEN 325 MG/1
650 TABLET ORAL
Status: CANCELLED | OUTPATIENT
Start: 2024-09-26

## 2024-09-25 RX ORDER — EPINEPHRINE 1 MG/ML
0.3 INJECTION, SOLUTION, CONCENTRATE INTRAVENOUS PRN
Status: CANCELLED | OUTPATIENT
Start: 2024-09-26

## 2024-09-25 RX ORDER — SODIUM CHLORIDE 9 MG/ML
INJECTION, SOLUTION INTRAVENOUS CONTINUOUS
Status: CANCELLED | OUTPATIENT
Start: 2024-09-26

## 2024-09-25 RX ORDER — SODIUM CHLORIDE 9 MG/ML
5-250 INJECTION, SOLUTION INTRAVENOUS PRN
Status: CANCELLED | OUTPATIENT
Start: 2024-09-26

## 2024-09-25 RX ORDER — PROCHLORPERAZINE MALEATE 10 MG
10 TABLET ORAL EVERY 6 HOURS PRN
Qty: 30 TABLET | Refills: 0 | Status: SHIPPED | OUTPATIENT
Start: 2024-09-25

## 2024-09-25 RX ORDER — FAMOTIDINE 10 MG/ML
20 INJECTION, SOLUTION INTRAVENOUS
Status: CANCELLED | OUTPATIENT
Start: 2024-09-26

## 2024-09-25 NOTE — PROGRESS NOTES
Patient and her  here today for chemotherapy teaching session. Chemotherapy Basics You tube video observed. Verbally reviewed at length Chemotherapy Tip Sheet , written handouts given to patient. Discussed and explained the routine of the department and what to expect on a typical day of chemotherapy as well as the physical environment of the department. All questions and concerns  answered at this time. Chemotherapy consent form signed. Labs drawn via venipuncture, Coban applied. Patient  tolerated well. Emotional support offered.

## 2024-09-25 NOTE — PROGRESS NOTES
Patient was seen today for chemotherapy education for TC for breast cancer. Patient was accompanied her . The schedule and mechanisms of action were discussed in detail. Some of the side effects discussed include, but are not limited to, bone marrow suppression, nausea/vomiting, hair loss, fatigue, diarrhea, constipation, fluid retention, infection, and hemorrhagic cystitis. Patient demonstrated comprehension and understanding throughout the education session. A packet containing the information discussed was provided to the patient. Prescriptions for Zofran and Compazine sent.    All questions asked were answered or deferred to the oncology team. Patient encouraged to reach out to their treatment team with any other additional questions or concerns that they may have.    Nam Lehman, PharmD, BCOP  Clinical Pharmacist, Hematology/Oncology  [facility]

## 2024-09-26 ENCOUNTER — HOSPITAL ENCOUNTER (OUTPATIENT)
Dept: INFUSION THERAPY | Age: 39
Discharge: HOME | End: 2024-09-26
Payer: COMMERCIAL

## 2024-09-26 ENCOUNTER — OFFICE VISIT (OUTPATIENT)
Dept: ONCOLOGY | Age: 39
End: 2024-09-26
Payer: COMMERCIAL

## 2024-09-26 ENCOUNTER — TELEPHONE (OUTPATIENT)
Dept: ONCOLOGY | Age: 39
End: 2024-09-26

## 2024-09-26 VITALS
HEIGHT: 62 IN | WEIGHT: 129.2 LBS | BODY MASS INDEX: 23.77 KG/M2 | HEART RATE: 77 BPM | OXYGEN SATURATION: 100 % | DIASTOLIC BLOOD PRESSURE: 77 MMHG | SYSTOLIC BLOOD PRESSURE: 111 MMHG | TEMPERATURE: 98.4 F

## 2024-09-26 VITALS
TEMPERATURE: 98.2 F | OXYGEN SATURATION: 100 % | HEART RATE: 80 BPM | DIASTOLIC BLOOD PRESSURE: 75 MMHG | RESPIRATION RATE: 16 BRPM | SYSTOLIC BLOOD PRESSURE: 102 MMHG

## 2024-09-26 DIAGNOSIS — C50.911 INVASIVE DUCTAL CARCINOMA OF BREAST, FEMALE, RIGHT (HCC): Primary | ICD-10-CM

## 2024-09-26 DIAGNOSIS — Z17.0 MALIGNANT NEOPLASM OF CENTRAL PORTION OF RIGHT BREAST IN FEMALE, ESTROGEN RECEPTOR POSITIVE (HCC): ICD-10-CM

## 2024-09-26 DIAGNOSIS — Z17.0 MALIGNANT NEOPLASM OF CENTRAL PORTION OF RIGHT BREAST IN FEMALE, ESTROGEN RECEPTOR POSITIVE (HCC): Primary | ICD-10-CM

## 2024-09-26 DIAGNOSIS — C50.111 MALIGNANT NEOPLASM OF CENTRAL PORTION OF RIGHT BREAST IN FEMALE, ESTROGEN RECEPTOR POSITIVE (HCC): ICD-10-CM

## 2024-09-26 DIAGNOSIS — C50.111 MALIGNANT NEOPLASM OF CENTRAL PORTION OF RIGHT BREAST IN FEMALE, ESTROGEN RECEPTOR POSITIVE (HCC): Primary | ICD-10-CM

## 2024-09-26 LAB
CONTROL: NORMAL
PREGNANCY TEST URINE, POC: NEGATIVE

## 2024-09-26 PROCEDURE — 6360000002 HC RX W HCPCS: Performed by: STUDENT IN AN ORGANIZED HEALTH CARE EDUCATION/TRAINING PROGRAM

## 2024-09-26 PROCEDURE — 96417 CHEMO IV INFUS EACH ADDL SEQ: CPT

## 2024-09-26 PROCEDURE — 1036F TOBACCO NON-USER: CPT | Performed by: STUDENT IN AN ORGANIZED HEALTH CARE EDUCATION/TRAINING PROGRAM

## 2024-09-26 PROCEDURE — G8427 DOCREV CUR MEDS BY ELIG CLIN: HCPCS | Performed by: STUDENT IN AN ORGANIZED HEALTH CARE EDUCATION/TRAINING PROGRAM

## 2024-09-26 PROCEDURE — 96375 TX/PRO/DX INJ NEW DRUG ADDON: CPT

## 2024-09-26 PROCEDURE — G8420 CALC BMI NORM PARAMETERS: HCPCS | Performed by: STUDENT IN AN ORGANIZED HEALTH CARE EDUCATION/TRAINING PROGRAM

## 2024-09-26 PROCEDURE — 81025 URINE PREGNANCY TEST: CPT | Performed by: STUDENT IN AN ORGANIZED HEALTH CARE EDUCATION/TRAINING PROGRAM

## 2024-09-26 PROCEDURE — 96413 CHEMO IV INFUSION 1 HR: CPT

## 2024-09-26 PROCEDURE — 2580000003 HC RX 258: Performed by: STUDENT IN AN ORGANIZED HEALTH CARE EDUCATION/TRAINING PROGRAM

## 2024-09-26 PROCEDURE — 96361 HYDRATE IV INFUSION ADD-ON: CPT

## 2024-09-26 PROCEDURE — 99214 OFFICE O/P EST MOD 30 MIN: CPT | Performed by: STUDENT IN AN ORGANIZED HEALTH CARE EDUCATION/TRAINING PROGRAM

## 2024-09-26 RX ORDER — LEVONORGESTREL 19.5 MG/1
1 INTRAUTERINE DEVICE INTRAUTERINE ONCE
COMMUNITY

## 2024-09-26 RX ORDER — DEXAMETHASONE SODIUM PHOSPHATE 10 MG/ML
10 INJECTION, SOLUTION INTRAMUSCULAR; INTRAVENOUS ONCE
Status: COMPLETED | OUTPATIENT
Start: 2024-09-26 | End: 2024-09-26

## 2024-09-26 RX ORDER — SODIUM CHLORIDE 0.9 % (FLUSH) 0.9 %
5-40 SYRINGE (ML) INJECTION PRN
Status: DISCONTINUED | OUTPATIENT
Start: 2024-09-26 | End: 2024-09-27 | Stop reason: HOSPADM

## 2024-09-26 RX ORDER — PALONOSETRON HYDROCHLORIDE 0.05 MG/ML
0.25 INJECTION, SOLUTION INTRAVENOUS ONCE
Status: COMPLETED | OUTPATIENT
Start: 2024-09-26 | End: 2024-09-26

## 2024-09-26 RX ORDER — SODIUM CHLORIDE 9 MG/ML
5-250 INJECTION, SOLUTION INTRAVENOUS PRN
Status: DISCONTINUED | OUTPATIENT
Start: 2024-09-26 | End: 2024-09-27 | Stop reason: HOSPADM

## 2024-09-26 RX ORDER — DEXAMETHASONE 4 MG/1
TABLET ORAL
Qty: 32 TABLET | Refills: 0 | Status: SHIPPED | OUTPATIENT
Start: 2024-09-26

## 2024-09-26 RX ADMIN — CYCLOPHOSPHAMIDE 960 MG: 200 INJECTION, SOLUTION INTRAVENOUS at 13:02

## 2024-09-26 RX ADMIN — DOCETAXEL 120 MG: 20 INJECTION, SOLUTION INTRAVENOUS at 11:46

## 2024-09-26 RX ADMIN — DEXAMETHASONE SODIUM PHOSPHATE 10 MG: 10 INJECTION, SOLUTION INTRAMUSCULAR; INTRAVENOUS at 11:15

## 2024-09-26 RX ADMIN — SODIUM CHLORIDE, PRESERVATIVE FREE 10 ML: 5 INJECTION INTRAVENOUS at 11:15

## 2024-09-26 RX ADMIN — PALONOSETRON 0.25 MG: 0.25 INJECTION, SOLUTION INTRAVENOUS at 11:19

## 2024-09-26 RX ADMIN — SODIUM CHLORIDE 200 ML/HR: 9 INJECTION, SOLUTION INTRAVENOUS at 11:15

## 2024-09-26 NOTE — PROGRESS NOTES
Patient tolerated infusion well. Patient alert and oriented x 3 No distress noted. Vital signs stable. Patient denies any new or worsening pain. Patient denies questions regarding treatment or medication; verbalized understanding.  Patient denies needs, all questions answered.

## 2024-09-26 NOTE — PROGRESS NOTES
Linda Bustamante  9/26/2024  Ht Readings from Last 1 Encounters:   09/26/24 1.575 m (5' 2\")     Wt Readings from Last 10 Encounters:   09/26/24 58.6 kg (129 lb 3.2 oz)   09/12/24 58.5 kg (128 lb 14.4 oz)   08/30/24 57.2 kg (126 lb)   08/13/24 55.8 kg (123 lb)   07/10/24 57.5 kg (126 lb 12.8 oz)   06/14/24 58.5 kg (129 lb)   06/06/24 58.1 kg (128 lb)   05/09/21 61.2 kg (135 lb)   06/15/19 60.8 kg (134 lb)   02/28/16 59 kg (130 lb)     BMI: 23.63    Assessment: Met w/ pt for introduction during first chemotherapy per referral for new breast CA. Reviewed role of oncology dietitian in pt's care. She denies any specific nutrition questions or concerns, noting that infusion nurse reviewed safety concerns with her during chemo teaching yesterday. Pt provided w/ contact information, encouraging her to contact this clinician as needed. Will follow PRN.    Barbara Oneill, MS, RD, LD

## 2024-09-27 ENCOUNTER — HOSPITAL ENCOUNTER (OUTPATIENT)
Dept: INFUSION THERAPY | Age: 39
Discharge: HOME | End: 2024-09-27
Payer: COMMERCIAL

## 2024-09-27 DIAGNOSIS — C50.911 INVASIVE DUCTAL CARCINOMA OF BREAST, FEMALE, RIGHT (HCC): Primary | ICD-10-CM

## 2024-09-27 PROCEDURE — 96372 THER/PROPH/DIAG INJ SC/IM: CPT

## 2024-09-27 PROCEDURE — 6360000002 HC RX W HCPCS: Performed by: STUDENT IN AN ORGANIZED HEALTH CARE EDUCATION/TRAINING PROGRAM

## 2024-09-27 RX ADMIN — PEGFILGRASTIM-JMDB 6 MG: 6 INJECTION SUBCUTANEOUS at 13:40

## 2024-10-02 ENCOUNTER — TELEPHONE (OUTPATIENT)
Dept: ONCOLOGY | Age: 39
End: 2024-10-02

## 2024-10-02 NOTE — TELEPHONE ENCOUNTER
PT called stating she is having severe pain in back and legs that is causing her not to be able to even sit down.  She states she starts Chemo Thursday and would like to know what she can take to relieve this pain.

## 2024-10-03 ENCOUNTER — TELEPHONE (OUTPATIENT)
Dept: ONCOLOGY | Age: 39
End: 2024-10-03

## 2024-10-03 NOTE — TELEPHONE ENCOUNTER
Pt c/o MSK symptoms, likely from G-CSF. I advised taking anti-histamine. She is on Zyretic. I also suggested adding on Pepcid on top of that as well. And could also try other OTC's as well. I offered her to reach out at any time if she has any questions or concerns.

## 2024-10-16 ENCOUNTER — HOSPITAL ENCOUNTER (OUTPATIENT)
Dept: INFUSION THERAPY | Age: 39
Discharge: HOME | End: 2024-10-16
Payer: COMMERCIAL

## 2024-10-16 DIAGNOSIS — C50.111 MALIGNANT NEOPLASM OF CENTRAL PORTION OF RIGHT BREAST IN FEMALE, ESTROGEN RECEPTOR POSITIVE (HCC): ICD-10-CM

## 2024-10-16 DIAGNOSIS — Z17.0 MALIGNANT NEOPLASM OF CENTRAL PORTION OF RIGHT BREAST IN FEMALE, ESTROGEN RECEPTOR POSITIVE (HCC): ICD-10-CM

## 2024-10-16 LAB
ALBUMIN SERPL-MCNC: 4.6 G/DL (ref 3.5–5.2)
ALP SERPL-CCNC: 119 U/L (ref 35–104)
ALT SERPL-CCNC: 13 U/L (ref 0–32)
ANION GAP SERPL CALCULATED.3IONS-SCNC: 12 MMOL/L (ref 7–16)
AST SERPL-CCNC: 18 U/L (ref 0–31)
BASOPHILS # BLD: 0.08 K/UL (ref 0–0.2)
BASOPHILS NFR BLD: 1 % (ref 0–2)
BILIRUB SERPL-MCNC: 0.2 MG/DL (ref 0–1.2)
BUN SERPL-MCNC: 13 MG/DL (ref 6–20)
CALCIUM SERPL-MCNC: 9.2 MG/DL (ref 8.6–10.2)
CHLORIDE SERPL-SCNC: 108 MMOL/L (ref 98–107)
CO2 SERPL-SCNC: 20 MMOL/L (ref 22–29)
CREAT SERPL-MCNC: 0.7 MG/DL (ref 0.5–1)
EOSINOPHIL # BLD: 0 K/UL (ref 0.05–0.5)
EOSINOPHILS RELATIVE PERCENT: 0 % (ref 0–6)
ERYTHROCYTE [DISTWIDTH] IN BLOOD BY AUTOMATED COUNT: 12.7 % (ref 11.5–15)
GFR, ESTIMATED: >90 ML/MIN/1.73M2
GLUCOSE SERPL-MCNC: 107 MG/DL (ref 74–99)
HCT VFR BLD AUTO: 42 % (ref 34–48)
HGB BLD-MCNC: 14.1 G/DL (ref 11.5–15.5)
IMM GRANULOCYTES # BLD AUTO: 0.06 K/UL (ref 0–0.58)
IMM GRANULOCYTES NFR BLD: 1 % (ref 0–5)
LYMPHOCYTES NFR BLD: 0.89 K/UL (ref 1.5–4)
LYMPHOCYTES RELATIVE PERCENT: 9 % (ref 20–42)
MCH RBC QN AUTO: 31.9 PG (ref 26–35)
MCHC RBC AUTO-ENTMCNC: 33.6 G/DL (ref 32–34.5)
MCV RBC AUTO: 95 FL (ref 80–99.9)
MONOCYTES NFR BLD: 0.09 K/UL (ref 0.1–0.95)
MONOCYTES NFR BLD: 1 % (ref 2–12)
NEUTROPHILS NFR BLD: 88 % (ref 43–80)
NEUTS SEG NFR BLD: 8.35 K/UL (ref 1.8–7.3)
PLATELET # BLD AUTO: 338 K/UL (ref 130–450)
PMV BLD AUTO: 10.6 FL (ref 7–12)
POTASSIUM SERPL-SCNC: 4.1 MMOL/L (ref 3.5–5)
PROT SERPL-MCNC: 7.7 G/DL (ref 6.4–8.3)
RBC # BLD AUTO: 4.42 M/UL (ref 3.5–5.5)
SODIUM SERPL-SCNC: 140 MMOL/L (ref 132–146)
WBC OTHER # BLD: 9.5 K/UL (ref 4.5–11.5)

## 2024-10-16 PROCEDURE — 80053 COMPREHEN METABOLIC PANEL: CPT

## 2024-10-16 PROCEDURE — 85025 COMPLETE CBC W/AUTO DIFF WBC: CPT

## 2024-10-16 PROCEDURE — 36415 COLL VENOUS BLD VENIPUNCTURE: CPT

## 2024-10-17 ENCOUNTER — HOSPITAL ENCOUNTER (OUTPATIENT)
Dept: INFUSION THERAPY | Age: 39
Discharge: HOME | End: 2024-10-17
Payer: COMMERCIAL

## 2024-10-17 ENCOUNTER — OFFICE VISIT (OUTPATIENT)
Dept: ONCOLOGY | Age: 39
End: 2024-10-17
Payer: COMMERCIAL

## 2024-10-17 VITALS
HEIGHT: 62 IN | WEIGHT: 129.2 LBS | OXYGEN SATURATION: 99 % | TEMPERATURE: 97.4 F | HEART RATE: 92 BPM | SYSTOLIC BLOOD PRESSURE: 110 MMHG | BODY MASS INDEX: 23.77 KG/M2 | DIASTOLIC BLOOD PRESSURE: 67 MMHG

## 2024-10-17 VITALS
HEART RATE: 78 BPM | SYSTOLIC BLOOD PRESSURE: 107 MMHG | TEMPERATURE: 98.1 F | RESPIRATION RATE: 16 BRPM | DIASTOLIC BLOOD PRESSURE: 60 MMHG

## 2024-10-17 DIAGNOSIS — C50.911 INVASIVE DUCTAL CARCINOMA OF BREAST, FEMALE, RIGHT (HCC): Primary | ICD-10-CM

## 2024-10-17 DIAGNOSIS — C50.111 MALIGNANT NEOPLASM OF CENTRAL PORTION OF RIGHT BREAST IN FEMALE, ESTROGEN RECEPTOR POSITIVE (HCC): ICD-10-CM

## 2024-10-17 DIAGNOSIS — Z17.0 MALIGNANT NEOPLASM OF CENTRAL PORTION OF RIGHT BREAST IN FEMALE, ESTROGEN RECEPTOR POSITIVE (HCC): ICD-10-CM

## 2024-10-17 LAB
CONTROL: NORMAL
PREGNANCY TEST URINE, POC: NEGATIVE

## 2024-10-17 PROCEDURE — 96361 HYDRATE IV INFUSION ADD-ON: CPT

## 2024-10-17 PROCEDURE — G8427 DOCREV CUR MEDS BY ELIG CLIN: HCPCS | Performed by: STUDENT IN AN ORGANIZED HEALTH CARE EDUCATION/TRAINING PROGRAM

## 2024-10-17 PROCEDURE — 81025 URINE PREGNANCY TEST: CPT | Performed by: STUDENT IN AN ORGANIZED HEALTH CARE EDUCATION/TRAINING PROGRAM

## 2024-10-17 PROCEDURE — G8420 CALC BMI NORM PARAMETERS: HCPCS | Performed by: STUDENT IN AN ORGANIZED HEALTH CARE EDUCATION/TRAINING PROGRAM

## 2024-10-17 PROCEDURE — 96375 TX/PRO/DX INJ NEW DRUG ADDON: CPT

## 2024-10-17 PROCEDURE — G8484 FLU IMMUNIZE NO ADMIN: HCPCS | Performed by: STUDENT IN AN ORGANIZED HEALTH CARE EDUCATION/TRAINING PROGRAM

## 2024-10-17 PROCEDURE — 96413 CHEMO IV INFUSION 1 HR: CPT

## 2024-10-17 PROCEDURE — 6360000002 HC RX W HCPCS: Performed by: STUDENT IN AN ORGANIZED HEALTH CARE EDUCATION/TRAINING PROGRAM

## 2024-10-17 PROCEDURE — 2580000003 HC RX 258: Performed by: STUDENT IN AN ORGANIZED HEALTH CARE EDUCATION/TRAINING PROGRAM

## 2024-10-17 PROCEDURE — 1036F TOBACCO NON-USER: CPT | Performed by: STUDENT IN AN ORGANIZED HEALTH CARE EDUCATION/TRAINING PROGRAM

## 2024-10-17 PROCEDURE — 96417 CHEMO IV INFUS EACH ADDL SEQ: CPT

## 2024-10-17 PROCEDURE — 99214 OFFICE O/P EST MOD 30 MIN: CPT | Performed by: STUDENT IN AN ORGANIZED HEALTH CARE EDUCATION/TRAINING PROGRAM

## 2024-10-17 RX ORDER — DEXAMETHASONE SODIUM PHOSPHATE 10 MG/ML
10 INJECTION, SOLUTION INTRAMUSCULAR; INTRAVENOUS ONCE
Status: COMPLETED | OUTPATIENT
Start: 2024-10-17 | End: 2024-10-17

## 2024-10-17 RX ORDER — ACETAMINOPHEN 325 MG/1
650 TABLET ORAL
Status: CANCELLED | OUTPATIENT
Start: 2024-10-17

## 2024-10-17 RX ORDER — FAMOTIDINE 10 MG/ML
20 INJECTION, SOLUTION INTRAVENOUS
Status: CANCELLED | OUTPATIENT
Start: 2024-10-17

## 2024-10-17 RX ORDER — SODIUM CHLORIDE 9 MG/ML
5-250 INJECTION, SOLUTION INTRAVENOUS PRN
Status: DISCONTINUED | OUTPATIENT
Start: 2024-10-17 | End: 2024-10-18 | Stop reason: HOSPADM

## 2024-10-17 RX ORDER — PALONOSETRON 0.05 MG/ML
0.25 INJECTION, SOLUTION INTRAVENOUS ONCE
Status: CANCELLED | OUTPATIENT
Start: 2024-10-17 | End: 2024-10-17

## 2024-10-17 RX ORDER — ALBUTEROL SULFATE 90 UG/1
4 INHALANT RESPIRATORY (INHALATION) PRN
Status: CANCELLED | OUTPATIENT
Start: 2024-10-17

## 2024-10-17 RX ORDER — DIPHENHYDRAMINE HYDROCHLORIDE 50 MG/ML
50 INJECTION INTRAMUSCULAR; INTRAVENOUS
Status: CANCELLED | OUTPATIENT
Start: 2024-10-17

## 2024-10-17 RX ORDER — SODIUM CHLORIDE 0.9 % (FLUSH) 0.9 %
5-40 SYRINGE (ML) INJECTION PRN
Status: CANCELLED | OUTPATIENT
Start: 2024-10-17

## 2024-10-17 RX ORDER — MEPERIDINE HYDROCHLORIDE 50 MG/ML
12.5 INJECTION INTRAMUSCULAR; INTRAVENOUS; SUBCUTANEOUS PRN
Status: CANCELLED | OUTPATIENT
Start: 2024-10-17

## 2024-10-17 RX ORDER — SODIUM CHLORIDE 9 MG/ML
5-250 INJECTION, SOLUTION INTRAVENOUS PRN
Status: CANCELLED | OUTPATIENT
Start: 2024-10-17

## 2024-10-17 RX ORDER — HEPARIN SODIUM (PORCINE) LOCK FLUSH IV SOLN 100 UNIT/ML 100 UNIT/ML
500 SOLUTION INTRAVENOUS PRN
Status: CANCELLED | OUTPATIENT
Start: 2024-10-17

## 2024-10-17 RX ORDER — PALONOSETRON HYDROCHLORIDE 0.05 MG/ML
0.25 INJECTION, SOLUTION INTRAVENOUS ONCE
Status: COMPLETED | OUTPATIENT
Start: 2024-10-17 | End: 2024-10-17

## 2024-10-17 RX ORDER — SODIUM CHLORIDE 9 MG/ML
INJECTION, SOLUTION INTRAVENOUS CONTINUOUS
Status: CANCELLED | OUTPATIENT
Start: 2024-10-17

## 2024-10-17 RX ORDER — EPINEPHRINE 1 MG/ML
0.3 INJECTION, SOLUTION, CONCENTRATE INTRAVENOUS PRN
Status: CANCELLED | OUTPATIENT
Start: 2024-10-17

## 2024-10-17 RX ORDER — ONDANSETRON 2 MG/ML
8 INJECTION INTRAMUSCULAR; INTRAVENOUS
Status: CANCELLED | OUTPATIENT
Start: 2024-10-17

## 2024-10-17 RX ORDER — SODIUM CHLORIDE 0.9 % (FLUSH) 0.9 %
5-40 SYRINGE (ML) INJECTION PRN
Status: DISCONTINUED | OUTPATIENT
Start: 2024-10-17 | End: 2024-10-18 | Stop reason: HOSPADM

## 2024-10-17 RX ADMIN — SODIUM CHLORIDE 200 ML/HR: 9 INJECTION, SOLUTION INTRAVENOUS at 08:40

## 2024-10-17 RX ADMIN — SODIUM CHLORIDE, PRESERVATIVE FREE 10 ML: 5 INJECTION INTRAVENOUS at 08:41

## 2024-10-17 RX ADMIN — DEXAMETHASONE SODIUM PHOSPHATE 10 MG: 10 INJECTION INTRAMUSCULAR; INTRAVENOUS at 08:40

## 2024-10-17 RX ADMIN — CYCLOPHOSPHAMIDE 960 MG: 200 INJECTION, SOLUTION INTRAVENOUS at 10:29

## 2024-10-17 RX ADMIN — DOCETAXEL ANHYDROUS 120 MG: 20 INJECTION, SOLUTION INTRAVENOUS at 09:07

## 2024-10-17 RX ADMIN — PALONOSETRON 0.25 MG: 0.25 INJECTION, SOLUTION INTRAVENOUS at 08:44

## 2024-10-18 ENCOUNTER — HOSPITAL ENCOUNTER (OUTPATIENT)
Dept: INFUSION THERAPY | Age: 39
Discharge: HOME | End: 2024-10-18
Payer: COMMERCIAL

## 2024-10-18 ENCOUNTER — OFFICE VISIT (OUTPATIENT)
Dept: SURGERY | Age: 39
End: 2024-10-18

## 2024-10-18 VITALS — TEMPERATURE: 97.5 F

## 2024-10-18 DIAGNOSIS — C50.911 INVASIVE DUCTAL CARCINOMA OF BREAST, FEMALE, RIGHT (HCC): Primary | ICD-10-CM

## 2024-10-18 PROCEDURE — 6360000002 HC RX W HCPCS: Performed by: STUDENT IN AN ORGANIZED HEALTH CARE EDUCATION/TRAINING PROGRAM

## 2024-10-18 PROCEDURE — 96372 THER/PROPH/DIAG INJ SC/IM: CPT

## 2024-10-18 RX ADMIN — PEGFILGRASTIM-JMDB 6 MG: 6 INJECTION SUBCUTANEOUS at 11:34

## 2024-10-18 NOTE — PROGRESS NOTES
node metastasis identified or ITCs  only  N Suffix: (sn) - [place] node(s) evaluated.  pM Category: Not applicable - pM cannot be determined from the  submitted specimen(s)    Additional Findings: Fibrocystic changes, sclerosing adenosis, and  prior biopsy cavity reaction.    Special Studies:  Breast Biomarker Testing Performed on Previous Biopsy([id number]):       ER: Positive       ME: Positive       Her-2: Negative      Lisa Jimenez MD  **Electronically Signed Out**        zw/8/21/2024     Clinical Information  Procedure: Right breast mastectomy with sentinel lymph node biopsy,  right breast implant/tissue expander insertion, first stage right  breast reconstruction using tissue expander and AlloDerm.      Pre-Operative Diagnosis  Malignant neoplasm of right breast    Post Operative Diagnosis  Malignant neoplasm of right breast    Source of Specimen  A: Right Breast Mastectomy  B: SENTINEL LYMPH NODE OTHER CANCER 1  C: SENTINEL LYMPH NODE OTHER CANCER 2      Gross Description  Time of fixation is greater than 6 hours and less than 72 hours for  all specimen(s) submitted.  Cold ischemic time for part A is not provided; B 1 minute, 3 0  minutes.      The specimen is received in buffered formalin in three parts labeled  \"Linda Bustamante.\"       A. \"right breast mastectomy, no chemo, no radiation, 324.5 grams.\"  The specimen consists of a simple mastectomy that measures 14.3 x 13.5  x 4.5 cm.  The cutaneous surface has previously been inked green, is  elliptically shaped measuring 10.2 x 4.7 cm with a nipple in the mid  portion measuring 1.2 x 1.0 cm that comes within 1.4 cm from the  inferior cutaneous margin.  The specimen has previously been inked for  orientation in accordance with the [employer] Surgical Margin Marking  system: superior-red, inferior-blue, medial-yellow, lateral-orange,  anterior-green and posterior-black.  The specimen is sectioned from  medial to lateral to reveal a poorly circumscribed,

## 2024-11-06 ENCOUNTER — HOSPITAL ENCOUNTER (OUTPATIENT)
Dept: INFUSION THERAPY | Age: 39
Discharge: HOME | End: 2024-11-06
Payer: COMMERCIAL

## 2024-11-06 DIAGNOSIS — Z17.0 MALIGNANT NEOPLASM OF CENTRAL PORTION OF RIGHT BREAST IN FEMALE, ESTROGEN RECEPTOR POSITIVE (HCC): ICD-10-CM

## 2024-11-06 DIAGNOSIS — C50.111 MALIGNANT NEOPLASM OF CENTRAL PORTION OF RIGHT BREAST IN FEMALE, ESTROGEN RECEPTOR POSITIVE (HCC): ICD-10-CM

## 2024-11-06 LAB
ALBUMIN SERPL-MCNC: 4.3 G/DL (ref 3.5–5.2)
ALP SERPL-CCNC: 99 U/L (ref 35–104)
ALT SERPL-CCNC: 11 U/L (ref 0–32)
ANION GAP SERPL CALCULATED.3IONS-SCNC: 10 MMOL/L (ref 7–16)
AST SERPL-CCNC: 16 U/L (ref 0–31)
BASOPHILS # BLD: 0.09 K/UL (ref 0–0.2)
BASOPHILS NFR BLD: 1 % (ref 0–2)
BILIRUB SERPL-MCNC: 0.3 MG/DL (ref 0–1.2)
BUN SERPL-MCNC: 12 MG/DL (ref 6–20)
CALCIUM SERPL-MCNC: 9 MG/DL (ref 8.6–10.2)
CHLORIDE SERPL-SCNC: 109 MMOL/L (ref 98–107)
CO2 SERPL-SCNC: 23 MMOL/L (ref 22–29)
CREAT SERPL-MCNC: 0.8 MG/DL (ref 0.5–1)
EOSINOPHIL # BLD: 0.01 K/UL (ref 0.05–0.5)
EOSINOPHILS RELATIVE PERCENT: 0 % (ref 0–6)
ERYTHROCYTE [DISTWIDTH] IN BLOOD BY AUTOMATED COUNT: 13.6 % (ref 11.5–15)
GFR, ESTIMATED: >90 ML/MIN/1.73M2
GLUCOSE SERPL-MCNC: 99 MG/DL (ref 74–99)
HCT VFR BLD AUTO: 40.1 % (ref 34–48)
HGB BLD-MCNC: 13.3 G/DL (ref 11.5–15.5)
IMM GRANULOCYTES # BLD AUTO: 0.07 K/UL (ref 0–0.58)
IMM GRANULOCYTES NFR BLD: 1 % (ref 0–5)
LYMPHOCYTES NFR BLD: 1.63 K/UL (ref 1.5–4)
LYMPHOCYTES RELATIVE PERCENT: 24 % (ref 20–42)
MCH RBC QN AUTO: 32 PG (ref 26–35)
MCHC RBC AUTO-ENTMCNC: 33.2 G/DL (ref 32–34.5)
MCV RBC AUTO: 96.4 FL (ref 80–99.9)
MONOCYTES NFR BLD: 0.31 K/UL (ref 0.1–0.95)
MONOCYTES NFR BLD: 5 % (ref 2–12)
NEUTROPHILS NFR BLD: 69 % (ref 43–80)
NEUTS SEG NFR BLD: 4.65 K/UL (ref 1.8–7.3)
PLATELET # BLD AUTO: 255 K/UL (ref 130–450)
PMV BLD AUTO: 10.7 FL (ref 7–12)
POTASSIUM SERPL-SCNC: 3.9 MMOL/L (ref 3.5–5)
PROT SERPL-MCNC: 7 G/DL (ref 6.4–8.3)
RBC # BLD AUTO: 4.16 M/UL (ref 3.5–5.5)
SODIUM SERPL-SCNC: 142 MMOL/L (ref 132–146)
WBC OTHER # BLD: 6.8 K/UL (ref 4.5–11.5)

## 2024-11-06 PROCEDURE — 80053 COMPREHEN METABOLIC PANEL: CPT

## 2024-11-06 PROCEDURE — 36415 COLL VENOUS BLD VENIPUNCTURE: CPT

## 2024-11-06 PROCEDURE — 85025 COMPLETE CBC W/AUTO DIFF WBC: CPT

## 2024-11-07 ENCOUNTER — OFFICE VISIT (OUTPATIENT)
Dept: ONCOLOGY | Age: 39
End: 2024-11-07
Payer: COMMERCIAL

## 2024-11-07 ENCOUNTER — HOSPITAL ENCOUNTER (OUTPATIENT)
Dept: INFUSION THERAPY | Age: 39
Discharge: HOME | End: 2024-11-07
Payer: COMMERCIAL

## 2024-11-07 ENCOUNTER — TELEPHONE (OUTPATIENT)
Dept: CASE MANAGEMENT | Age: 39
End: 2024-11-07

## 2024-11-07 VITALS
DIASTOLIC BLOOD PRESSURE: 63 MMHG | RESPIRATION RATE: 16 BRPM | SYSTOLIC BLOOD PRESSURE: 97 MMHG | HEART RATE: 69 BPM | OXYGEN SATURATION: 100 % | TEMPERATURE: 98.2 F

## 2024-11-07 VITALS
OXYGEN SATURATION: 100 % | SYSTOLIC BLOOD PRESSURE: 110 MMHG | WEIGHT: 130 LBS | TEMPERATURE: 97.3 F | HEART RATE: 74 BPM | HEIGHT: 62 IN | DIASTOLIC BLOOD PRESSURE: 64 MMHG | BODY MASS INDEX: 23.92 KG/M2

## 2024-11-07 DIAGNOSIS — C50.911 INVASIVE DUCTAL CARCINOMA OF BREAST, FEMALE, RIGHT (HCC): Primary | ICD-10-CM

## 2024-11-07 LAB
CONTROL: NORMAL
PREGNANCY TEST URINE, POC: NEGATIVE

## 2024-11-07 PROCEDURE — 96417 CHEMO IV INFUS EACH ADDL SEQ: CPT

## 2024-11-07 PROCEDURE — 1036F TOBACCO NON-USER: CPT | Performed by: STUDENT IN AN ORGANIZED HEALTH CARE EDUCATION/TRAINING PROGRAM

## 2024-11-07 PROCEDURE — 96361 HYDRATE IV INFUSION ADD-ON: CPT

## 2024-11-07 PROCEDURE — 99214 OFFICE O/P EST MOD 30 MIN: CPT | Performed by: STUDENT IN AN ORGANIZED HEALTH CARE EDUCATION/TRAINING PROGRAM

## 2024-11-07 PROCEDURE — 96375 TX/PRO/DX INJ NEW DRUG ADDON: CPT

## 2024-11-07 PROCEDURE — G8427 DOCREV CUR MEDS BY ELIG CLIN: HCPCS | Performed by: STUDENT IN AN ORGANIZED HEALTH CARE EDUCATION/TRAINING PROGRAM

## 2024-11-07 PROCEDURE — 96374 THER/PROPH/DIAG INJ IV PUSH: CPT

## 2024-11-07 PROCEDURE — G8484 FLU IMMUNIZE NO ADMIN: HCPCS | Performed by: STUDENT IN AN ORGANIZED HEALTH CARE EDUCATION/TRAINING PROGRAM

## 2024-11-07 PROCEDURE — 6360000002 HC RX W HCPCS: Performed by: STUDENT IN AN ORGANIZED HEALTH CARE EDUCATION/TRAINING PROGRAM

## 2024-11-07 PROCEDURE — 2580000003 HC RX 258: Performed by: STUDENT IN AN ORGANIZED HEALTH CARE EDUCATION/TRAINING PROGRAM

## 2024-11-07 PROCEDURE — 96413 CHEMO IV INFUSION 1 HR: CPT

## 2024-11-07 PROCEDURE — G8420 CALC BMI NORM PARAMETERS: HCPCS | Performed by: STUDENT IN AN ORGANIZED HEALTH CARE EDUCATION/TRAINING PROGRAM

## 2024-11-07 RX ORDER — ACETAMINOPHEN 325 MG/1
650 TABLET ORAL
Status: CANCELLED | OUTPATIENT
Start: 2024-11-07

## 2024-11-07 RX ORDER — FAMOTIDINE 10 MG/ML
20 INJECTION, SOLUTION INTRAVENOUS
Status: CANCELLED | OUTPATIENT
Start: 2024-11-07

## 2024-11-07 RX ORDER — HEPARIN SODIUM (PORCINE) LOCK FLUSH IV SOLN 100 UNIT/ML 100 UNIT/ML
500 SOLUTION INTRAVENOUS PRN
Status: CANCELLED | OUTPATIENT
Start: 2024-11-07

## 2024-11-07 RX ORDER — ONDANSETRON 2 MG/ML
8 INJECTION INTRAMUSCULAR; INTRAVENOUS
Status: CANCELLED | OUTPATIENT
Start: 2024-11-07

## 2024-11-07 RX ORDER — SODIUM CHLORIDE 0.9 % (FLUSH) 0.9 %
5-40 SYRINGE (ML) INJECTION PRN
Status: DISCONTINUED | OUTPATIENT
Start: 2024-11-07 | End: 2024-11-08 | Stop reason: HOSPADM

## 2024-11-07 RX ORDER — DEXAMETHASONE SODIUM PHOSPHATE 10 MG/ML
10 INJECTION, SOLUTION INTRAMUSCULAR; INTRAVENOUS ONCE
Status: COMPLETED | OUTPATIENT
Start: 2024-11-07 | End: 2024-11-07

## 2024-11-07 RX ORDER — SODIUM CHLORIDE 9 MG/ML
INJECTION, SOLUTION INTRAVENOUS CONTINUOUS
Status: CANCELLED | OUTPATIENT
Start: 2024-11-07

## 2024-11-07 RX ORDER — SODIUM CHLORIDE 0.9 % (FLUSH) 0.9 %
5-40 SYRINGE (ML) INJECTION PRN
Status: CANCELLED | OUTPATIENT
Start: 2024-11-07

## 2024-11-07 RX ORDER — ALBUTEROL SULFATE 90 UG/1
4 INHALANT RESPIRATORY (INHALATION) PRN
Status: CANCELLED | OUTPATIENT
Start: 2024-11-07

## 2024-11-07 RX ORDER — PALONOSETRON HYDROCHLORIDE 0.05 MG/ML
0.25 INJECTION, SOLUTION INTRAVENOUS ONCE
Status: COMPLETED | OUTPATIENT
Start: 2024-11-07 | End: 2024-11-07

## 2024-11-07 RX ORDER — DIPHENHYDRAMINE HYDROCHLORIDE 50 MG/ML
50 INJECTION INTRAMUSCULAR; INTRAVENOUS
Status: CANCELLED | OUTPATIENT
Start: 2024-11-07

## 2024-11-07 RX ORDER — MEPERIDINE HYDROCHLORIDE 50 MG/ML
12.5 INJECTION INTRAMUSCULAR; INTRAVENOUS; SUBCUTANEOUS PRN
Status: CANCELLED | OUTPATIENT
Start: 2024-11-07

## 2024-11-07 RX ORDER — SODIUM CHLORIDE 9 MG/ML
5-250 INJECTION, SOLUTION INTRAVENOUS PRN
Status: CANCELLED | OUTPATIENT
Start: 2024-11-07

## 2024-11-07 RX ORDER — PALONOSETRON 0.05 MG/ML
0.25 INJECTION, SOLUTION INTRAVENOUS ONCE
Status: CANCELLED | OUTPATIENT
Start: 2024-11-07 | End: 2024-11-07

## 2024-11-07 RX ORDER — EPINEPHRINE 1 MG/ML
0.3 INJECTION, SOLUTION, CONCENTRATE INTRAVENOUS PRN
Status: CANCELLED | OUTPATIENT
Start: 2024-11-07

## 2024-11-07 RX ORDER — SODIUM CHLORIDE 9 MG/ML
5-250 INJECTION, SOLUTION INTRAVENOUS PRN
Status: DISCONTINUED | OUTPATIENT
Start: 2024-11-07 | End: 2024-11-08 | Stop reason: HOSPADM

## 2024-11-07 RX ADMIN — DOCETAXEL ANHYDROUS 120 MG: 20 INJECTION, SOLUTION INTRAVENOUS at 09:07

## 2024-11-07 RX ADMIN — CYCLOPHOSPHAMIDE 960 MG: 200 INJECTION, SOLUTION INTRAVENOUS at 10:21

## 2024-11-07 RX ADMIN — DEXAMETHASONE SODIUM PHOSPHATE 10 MG: 10 INJECTION INTRAMUSCULAR; INTRAVENOUS at 08:43

## 2024-11-07 RX ADMIN — SODIUM CHLORIDE, PRESERVATIVE FREE 10 ML: 5 INJECTION INTRAVENOUS at 08:42

## 2024-11-07 RX ADMIN — PALONOSETRON 0.25 MG: 0.25 INJECTION, SOLUTION INTRAVENOUS at 08:43

## 2024-11-07 RX ADMIN — SODIUM CHLORIDE 100 ML/HR: 9 INJECTION, SOLUTION INTRAVENOUS at 08:43

## 2024-11-07 NOTE — PROGRESS NOTES
Patient tolerated cytoxan and docetaxel infusions well. Patient alert and oriented x3. No distress noted. Vital signs stable. Patient denies any new or worsening pain. Patient denies any needs. All questions answered. D/C in stable condition.

## 2024-11-07 NOTE — PROGRESS NOTES
[facility] MED ONCOLOGY  [address] [facility] [address]  Dept: [phone]  Loc: [phone]    Moon Lee MD   ·   MD Georgiana Mccollum APRN  ·  BELLA Torres        Hematology/Oncology   Clinic Progress Note              Patient: Linda Bustamante,  38 y.o. female    Date of Encounter: 11/07/2024     Referring Provider:  Samra Hill MD    Reason for Visit:   RIGHT breast cancer, ER/RI+, HER2-, early stage        PCP:  Yolis Chandler MD      Chief Complaint   Patient presents with    Breast Cancer         Subjective:  Overall doing well.  Cycle 2 was more tolerable compared to cycle 1.  Did have some nausea, and a days worth of MSK symptoms, but short lasting.       HPI from Initial Outpatient Consultation  (09/12/2024):  The patient is a 38 y.o. female who comes in for recent diagnosis of right sided breast cancer, having completed bilateral mastectomies last month. She is doing well after surgery and is accompanied by her .     She denies of significant pain, fevers, chills or bleeding.   History of her breast cancer is outlined below, but in short, the patient had felt a palpable mass, prompting mammogram done through [facility] radiology.   She completed biopsy in June of this year, confirming her diagnosis of ER/RI positive, Her2 negative breast, and LN was negative for malignancy.     She then proceeded with [right] mastectomies as well as insertion of expanders.     Today, we discussed next steps in management.     ONCOLOGIC HISTORY:    Diagnosis/Biopsy - 6/6/24          -- Diagnosis --  A.  Right breast, 12 o'clock, core needle biopsy:  Invasive ductal carcinoma, moderately differentiated (see breast  cancer marker studies and     comment).  Focal benign fibroadenoma.  B.  Right axilla, core needle biopsy:  Benign lymph node and adipose tissue segments.      Breast Cancer Marker Studies:Estrogen Receptors

## 2024-11-08 ENCOUNTER — TELEPHONE (OUTPATIENT)
Dept: OCCUPATIONAL THERAPY | Age: 39
End: 2024-11-08

## 2024-11-08 ENCOUNTER — HOSPITAL ENCOUNTER (OUTPATIENT)
Dept: INFUSION THERAPY | Age: 39
Discharge: HOME | End: 2024-11-08
Payer: COMMERCIAL

## 2024-11-08 DIAGNOSIS — C50.911 INVASIVE DUCTAL CARCINOMA OF BREAST, FEMALE, RIGHT (HCC): Primary | ICD-10-CM

## 2024-11-08 PROCEDURE — 96372 THER/PROPH/DIAG INJ SC/IM: CPT

## 2024-11-08 PROCEDURE — 6360000002 HC RX W HCPCS: Performed by: STUDENT IN AN ORGANIZED HEALTH CARE EDUCATION/TRAINING PROGRAM

## 2024-11-08 RX ADMIN — PEGFILGRASTIM-JMDB 6 MG: 6 INJECTION SUBCUTANEOUS at 11:31

## 2024-11-08 NOTE — TELEPHONE ENCOUNTER
Therapist received referral for lymphedema clinic evaluation.  Therapist called patient for schedule and required to leave voice message.  Therapist asked patient to return call to [facility] [phone] or [facility] [phone]

## 2024-11-08 NOTE — PROGRESS NOTES
Patient tolerated fulphiia injection well. Patient alert and oriented x3. No distress noted. Patient denies any needs. All questions answered. D/C in stable condition.

## 2024-11-08 NOTE — TELEPHONE ENCOUNTER
Met with patient during treatment. She states she is doing well. She is half way through treatment. I did provide her with Lymphedema Clinic brochure and placed a referral for consultation per Dr Wheeler. I instructed patient that their office will reach out to her for scheduling. Patient appreciative of information and visit.

## 2024-11-15 ENCOUNTER — HOSPITAL ENCOUNTER (OUTPATIENT)
Dept: OCCUPATIONAL THERAPY | Age: 39
Setting detail: THERAPIES SERIES
Discharge: HOME | End: 2024-11-15
Payer: COMMERCIAL

## 2024-11-15 PROCEDURE — 97165 OT EVAL LOW COMPLEX 30 MIN: CPT | Performed by: OCCUPATIONAL THERAPIST

## 2024-11-15 NOTE — PROGRESS NOTES
- 120 degrees  Strength within functional limits for daily life tasks with exception of shoulder movement patterns 3-/5    Sensation: within functional limits for light touch    OT G-codes:  Carrying, Handling, Moving objects   (Current status): CL   (Discharge Goal):  CH  Lymphedema Life Impact Scale Score:  45  Percent Impairment:  63%      Intervention: 12851, 18955, 15052, 11711     Eval Complexity: Low Complexity     Rehab Potential:                                 [x] Good  [] Fair  [] Poor        Suggested Professional Referral:       [x] No  [] Yes:  Barriers to Goal Achievement::          [x] No  [] Yes:  Domestic Concerns:                           [x] No  [] Yes:       Patient. Education:  [x] Plans/Goals, Risks/Benefits discussed  [] Home exercise program  Method of Education: [x] Verbal  [] Demo  [] Written  Comprehension of Education:  [x] Verbalizes understanding.  [] Demonstrates understanding.  [x] Needs Review.  [] Demonstrates/verbalizes understanding of HEP/Ed previously given.        Patient understands diagnosis/prognosis and consents to treatment, plan and goals: [x] Yes    [] No   This patient demonstrates the ability to follow the plan of care and progress towards goals.  Rehab potential is good       Assessment: Secondary Lymphedema; impaired shoulder range of motion, Stage 0, Affecting the Right Upper Extremity.  Patient will benefit from a Complete Decongestive Therapy protocol using manual lymphatic drainage techniques, skilled multilayer compression bandaging using short stretch bandages and foam padding, instruction in a home program of self massage and exercise, compression garment fitting and instruction in independent management strategies for lymphedema.           Goal Formulation: Patient  Time In: 0900            Time Out: 1000                      Timed Code Treatment Minutes: 60 minutes      CODE  Minutes  Units   12757 OT Eval Low 60 1   43030 OT Eval Medium

## 2024-11-19 ENCOUNTER — HOSPITAL ENCOUNTER (OUTPATIENT)
Dept: OCCUPATIONAL THERAPY | Age: 39
Setting detail: THERAPIES SERIES
Discharge: HOME | End: 2024-11-19
Payer: COMMERCIAL

## 2024-11-19 PROCEDURE — 97140 MANUAL THERAPY 1/> REGIONS: CPT

## 2024-11-19 PROCEDURE — 97535 SELF CARE MNGMENT TRAINING: CPT

## 2024-11-19 NOTE — PROGRESS NOTES
Occupational Therapy      OCCUPATIONAL THERAPY DAILY TREATMENT NOTE  Phone: [phone]  Fax: [phone]     Date:  2024  Initial Evaluation Date:  2024                                        Evaluating Therapist: SOLA Moctezuma/L, CLT-REBECCA     Patient Name:  Linda Bustamante                        :  1985     Restrictions/Precautions:   fall risk  Diagnosis:  Invasive ductal carcinoma of breast, female, right (C50.911)                                                     Date of Surgery/Injury: n/a     Insurance/Certification information:  [employer] [place] Medicaid      [id number]  Plan of care signed (Y/N): N  Visit# / total visits: Evaluation +1 tx     Referring Practitioner:  Prince Wheeler MD                             NPI:  [id number]  Specific Practitioner Orders: Evaluation and treatment     Assessment of current deficits   []Pain  []Skin Integrity   [x]Lymphedema   []Functional transfers/mobility   []ADLs   []Strength    []Cognition  []IADLs   []Safety Awareness   []  Motor Endurance    []Fine Motor Coordination   []Balance   []Vision/perception  []Sensation []Gross Motor Coordination  [x]ROM      OT PLAN OF CARE   OT POC based on physician orders, patient diagnosis and results of clinical assessment     Frequency/Duration:  1-2x per week for 12 treatment sessions from 2024 through 2025  Specific OT Treatment to include:      Plan of Care:      [x]97140-Manual Lymph Drainage and Combined Decongestive Therapy  [x]20025- Skilled Multilayer Short Stretch Compression Bandaging/ Therapeutic Exercise  [x]Skin Care Education           [x]HEP including Self MLD Education and/or Self Bandaging  [x]Education for Lymphedema Risks/Precautions     [x] Caregiver Education   []other:        Patient Specific Goal: to make it stop     STG: 3 weeks   Patient will demonstrate knowledge and understanding for lymphedema precautions, skin care and self management to decrease progression

## 2024-11-21 ENCOUNTER — HOSPITAL ENCOUNTER (OUTPATIENT)
Dept: OCCUPATIONAL THERAPY | Age: 39
Setting detail: THERAPIES SERIES
Discharge: HOME | End: 2024-11-21
Payer: COMMERCIAL

## 2024-11-21 PROCEDURE — 97535 SELF CARE MNGMENT TRAINING: CPT

## 2024-11-21 PROCEDURE — 97140 MANUAL THERAPY 1/> REGIONS: CPT

## 2024-11-21 NOTE — PROGRESS NOTES
Occupational Therapy      OCCUPATIONAL THERAPY DAILY TREATMENT NOTE  Phone: [phone]  Fax: [phone]     Date:  2024  Initial Evaluation Date:  2024                                        Evaluating Therapist: SOLA Moctezuma/L, CLT-REBECCA     Patient Name:  Linda Bustamante                        :  1985     Restrictions/Precautions:   fall risk  Diagnosis:  Invasive ductal carcinoma of breast, female, right (C50.911)                                                     Date of Surgery/Injury: n/a     Insurance/Certification information:  [employer] [place] Medicaid      [id number]  Plan of care signed (Y/N): N  Visit# / total visits: Evaluation +2 tx     Referring Practitioner:  Prince Wheeler MD                             NPI:  [id number]  Specific Practitioner Orders: Evaluation and treatment     Assessment of current deficits   []Pain  []Skin Integrity   [x]Lymphedema   []Functional transfers/mobility   []ADLs   []Strength    []Cognition  []IADLs   []Safety Awareness   []  Motor Endurance    []Fine Motor Coordination   []Balance   []Vision/perception  []Sensation []Gross Motor Coordination  [x]ROM      OT PLAN OF CARE   OT POC based on physician orders, patient diagnosis and results of clinical assessment     Frequency/Duration:  1-2x per week for 12 treatment sessions from 2024 through 2025  Specific OT Treatment to include:      Plan of Care:      [x]97140-Manual Lymph Drainage and Combined Decongestive Therapy  [x]74093- Skilled Multilayer Short Stretch Compression Bandaging/ Therapeutic Exercise  [x]Skin Care Education           [x]HEP including Self MLD Education and/or Self Bandaging  [x]Education for Lymphedema Risks/Precautions     [x] Caregiver Education   []other:        Patient Specific Goal: to make it stop     STG: 3 weeks   Patient will demonstrate knowledge and understanding for lymphedema precautions, skin care and self management to decrease progression

## 2024-11-26 ENCOUNTER — HOSPITAL ENCOUNTER (OUTPATIENT)
Dept: OCCUPATIONAL THERAPY | Age: 39
Setting detail: THERAPIES SERIES
Discharge: HOME | End: 2024-11-26
Payer: COMMERCIAL

## 2024-11-26 PROCEDURE — 97535 SELF CARE MNGMENT TRAINING: CPT

## 2024-11-26 PROCEDURE — 97140 MANUAL THERAPY 1/> REGIONS: CPT

## 2024-11-26 NOTE — PROGRESS NOTES
Occupational Therapy      OCCUPATIONAL THERAPY DAILY TREATMENT NOTE  Phone: [phone]  Fax: [phone]     Date:  2024  Initial Evaluation Date:  2024                                        Evaluating Therapist: SOLA Moctezuma/L, CLT-REBECCA     Patient Name:  Linda Bustamante                        :  1985     Restrictions/Precautions:   fall risk  Diagnosis:  Invasive ductal carcinoma of breast, female, right (C50.911)                                                     Date of Surgery/Injury: n/a     Insurance/Certification information:  [employer] [place] Medicaid      [id number]  Plan of care signed (Y/N): N  Visit# / total visits: Evaluation +3 tx     Referring Practitioner:  Prince Wheeler MD                             NPI:  [id number]  Specific Practitioner Orders: Evaluation and treatment     Assessment of current deficits   []Pain  []Skin Integrity   [x]Lymphedema   []Functional transfers/mobility   []ADLs   []Strength    []Cognition  []IADLs   []Safety Awareness   []  Motor Endurance    []Fine Motor Coordination   []Balance   []Vision/perception  []Sensation []Gross Motor Coordination  [x]ROM      OT PLAN OF CARE   OT POC based on physician orders, patient diagnosis and results of clinical assessment     Frequency/Duration:  1-2x per week for 12 treatment sessions from 2024 through 2025  Specific OT Treatment to include:      Plan of Care:      [x]97140-Manual Lymph Drainage and Combined Decongestive Therapy  [x]47840- Skilled Multilayer Short Stretch Compression Bandaging/ Therapeutic Exercise  [x]Skin Care Education           [x]HEP including Self MLD Education and/or Self Bandaging  [x]Education for Lymphedema Risks/Precautions     [x] Caregiver Education   []other:        Patient Specific Goal: to make it stop     STG: 3 weeks   Patient will demonstrate knowledge and understanding for lymphedema precautions, skin care and self management to decrease progression

## 2024-12-03 ENCOUNTER — HOSPITAL ENCOUNTER (OUTPATIENT)
Dept: OCCUPATIONAL THERAPY | Age: 39
Setting detail: THERAPIES SERIES
Discharge: HOME | End: 2024-12-03
Payer: COMMERCIAL

## 2024-12-03 PROCEDURE — 97140 MANUAL THERAPY 1/> REGIONS: CPT

## 2024-12-03 PROCEDURE — 97535 SELF CARE MNGMENT TRAINING: CPT

## 2024-12-03 NOTE — PROGRESS NOTES
99.75                                     chest                       plam        17 17       18  18      Fingers are measured in cm and between mp and pip and between pip and dip each digit.     Digit Follow up #4  Left -  Follow up #3  Left -  Follow up #2  Left -  Follow up #1  Left -  Evaluation -  Left -  Follow up #4  Right -  Follow up #3  Right -  Follow up #2  Right -  Follow up #1  Right -  Evaluation - Right -                                                    First Digit       5.5, 4.5  5.75, 4.75       5.75, 4.75  5.75, 5   Second Digit       5.25, 4.5  5.5, 4.75       5.25, 4.75  5.75, 5   Third Digit       R, 4  5, 4.25       5, 4  5, 4.5   Fourth Digit       4.5, 3.5  4.75, 4       4.5, 4  5, 4   Fifth Digit       5.5  5.75       6  6                                Assessment:  Knowledge of home program:   [x] Good [] Fair  [] Poor  Patient is programming at home:             [x] Yes  [] No  Family is assisting with programming: [] Yes  [x] No  Home programming is consistent:             [x] Yes  [] No  Other:   Response to treatment: Continued treatment this day with increased understanding shown.   Instructions for patient:   [x] Verbal [x] Written  Instructions addressed: Patient educated throughout session on how the exercises, manipulation, and compression work together to reduce the swelling through chest. Will continue to wait on compression bra to maximize compression to chest.     Time In: 09+00            Time Out: 1000                     Timed Code Treatment Minutes: 60 minutes      CODE  Minutes  Units   39980 OT Eval Low     53454 OT Eval Medium     84741 OT Eval High     88988 Fluidotherapy     00926 Manual 46 3   43281 Therapeutic Ex     74962 Therapeutic Activity     88860 ADL/COMP Tech Train 14 1   88659 Neuromuscular Re-Ed     20333 OrthoManagementTraining     00445 Paraffin     85224 Electrical Stim - Attended     42532 Iontophoresis     93789 Ultrasound      Other

## 2024-12-04 ENCOUNTER — TELEPHONE (OUTPATIENT)
Dept: INFUSION THERAPY | Age: 39
End: 2024-12-04

## 2024-12-04 ENCOUNTER — HOSPITAL ENCOUNTER (OUTPATIENT)
Dept: INFUSION THERAPY | Age: 39
Discharge: HOME | End: 2024-12-04
Payer: COMMERCIAL

## 2024-12-04 DIAGNOSIS — C50.111 MALIGNANT NEOPLASM OF CENTRAL PORTION OF RIGHT BREAST IN FEMALE, ESTROGEN RECEPTOR POSITIVE (HCC): ICD-10-CM

## 2024-12-04 DIAGNOSIS — Z17.0 MALIGNANT NEOPLASM OF CENTRAL PORTION OF RIGHT BREAST IN FEMALE, ESTROGEN RECEPTOR POSITIVE (HCC): ICD-10-CM

## 2024-12-04 LAB
ALBUMIN SERPL-MCNC: 4.4 G/DL (ref 3.5–5.2)
ALP SERPL-CCNC: 97 U/L (ref 35–104)
ALT SERPL-CCNC: 9 U/L (ref 0–32)
ANION GAP SERPL CALCULATED.3IONS-SCNC: 11 MMOL/L (ref 7–16)
AST SERPL-CCNC: 16 U/L (ref 0–31)
BASOPHILS # BLD: 0.08 K/UL (ref 0–0.2)
BASOPHILS NFR BLD: 1 % (ref 0–2)
BILIRUB SERPL-MCNC: 0.3 MG/DL (ref 0–1.2)
BUN SERPL-MCNC: 16 MG/DL (ref 6–20)
CALCIUM SERPL-MCNC: 9.5 MG/DL (ref 8.6–10.2)
CHLORIDE SERPL-SCNC: 107 MMOL/L (ref 98–107)
CO2 SERPL-SCNC: 24 MMOL/L (ref 22–29)
CREAT SERPL-MCNC: 0.8 MG/DL (ref 0.5–1)
EOSINOPHIL # BLD: 0.1 K/UL (ref 0.05–0.5)
EOSINOPHILS RELATIVE PERCENT: 1 % (ref 0–6)
ERYTHROCYTE [DISTWIDTH] IN BLOOD BY AUTOMATED COUNT: 14.5 % (ref 11.5–15)
GFR, ESTIMATED: >90 ML/MIN/1.73M2
GLUCOSE SERPL-MCNC: 87 MG/DL (ref 74–99)
HCT VFR BLD AUTO: 40.4 % (ref 34–48)
HGB BLD-MCNC: 13.2 G/DL (ref 11.5–15.5)
IMM GRANULOCYTES # BLD AUTO: 0.03 K/UL (ref 0–0.58)
IMM GRANULOCYTES NFR BLD: 0 % (ref 0–5)
LYMPHOCYTES NFR BLD: 0.71 K/UL (ref 1.5–4)
LYMPHOCYTES RELATIVE PERCENT: 8 % (ref 20–42)
MCH RBC QN AUTO: 32 PG (ref 26–35)
MCHC RBC AUTO-ENTMCNC: 32.7 G/DL (ref 32–34.5)
MCV RBC AUTO: 98.1 FL (ref 80–99.9)
MONOCYTES NFR BLD: 0.16 K/UL (ref 0.1–0.95)
MONOCYTES NFR BLD: 2 % (ref 2–12)
NEUTROPHILS NFR BLD: 88 % (ref 43–80)
NEUTS SEG NFR BLD: 8.2 K/UL (ref 1.8–7.3)
PLATELET # BLD AUTO: 297 K/UL (ref 130–450)
PMV BLD AUTO: 10.4 FL (ref 7–12)
POTASSIUM SERPL-SCNC: 3.9 MMOL/L (ref 3.5–5)
PROT SERPL-MCNC: 6.9 G/DL (ref 6.4–8.3)
RBC # BLD AUTO: 4.12 M/UL (ref 3.5–5.5)
SODIUM SERPL-SCNC: 142 MMOL/L (ref 132–146)
WBC OTHER # BLD: 9.3 K/UL (ref 4.5–11.5)

## 2024-12-04 PROCEDURE — 85025 COMPLETE CBC W/AUTO DIFF WBC: CPT

## 2024-12-04 PROCEDURE — 36415 COLL VENOUS BLD VENIPUNCTURE: CPT

## 2024-12-04 PROCEDURE — 80053 COMPREHEN METABOLIC PANEL: CPT

## 2024-12-04 NOTE — TELEPHONE ENCOUNTER
PT IN FOR LABS AND STATED ANGELIQUE HAD LAPSED THROUGH NO FAULT OF HERS AND WILL BE REINSTATED. SHE SPENT 2 HOURS ON THE PHONE AND COVERAGE WILL BE BACKDATED (PER PT). I REACHED OUT TO PAM MCGARRY TO LET HER KNOW.

## 2024-12-05 ENCOUNTER — TELEPHONE (OUTPATIENT)
Dept: SURGERY | Age: 39
End: 2024-12-05

## 2024-12-05 ENCOUNTER — HOSPITAL ENCOUNTER (OUTPATIENT)
Dept: INFUSION THERAPY | Age: 39
Discharge: HOME | End: 2024-12-05
Payer: COMMERCIAL

## 2024-12-05 ENCOUNTER — OFFICE VISIT (OUTPATIENT)
Dept: ONCOLOGY | Age: 39
End: 2024-12-05
Payer: COMMERCIAL

## 2024-12-05 VITALS
TEMPERATURE: 99 F | HEART RATE: 93 BPM | BODY MASS INDEX: 24.11 KG/M2 | SYSTOLIC BLOOD PRESSURE: 120 MMHG | OXYGEN SATURATION: 97 % | DIASTOLIC BLOOD PRESSURE: 80 MMHG | WEIGHT: 131 LBS | HEIGHT: 62 IN

## 2024-12-05 VITALS
OXYGEN SATURATION: 99 % | RESPIRATION RATE: 16 BRPM | SYSTOLIC BLOOD PRESSURE: 107 MMHG | TEMPERATURE: 97.9 F | DIASTOLIC BLOOD PRESSURE: 72 MMHG | HEART RATE: 90 BPM

## 2024-12-05 DIAGNOSIS — C50.111 MALIGNANT NEOPLASM OF CENTRAL PORTION OF RIGHT BREAST IN FEMALE, ESTROGEN RECEPTOR POSITIVE (HCC): ICD-10-CM

## 2024-12-05 DIAGNOSIS — Z17.0 MALIGNANT NEOPLASM OF CENTRAL PORTION OF RIGHT BREAST IN FEMALE, ESTROGEN RECEPTOR POSITIVE (HCC): ICD-10-CM

## 2024-12-05 DIAGNOSIS — C50.911 INVASIVE DUCTAL CARCINOMA OF BREAST, FEMALE, RIGHT (HCC): Primary | ICD-10-CM

## 2024-12-05 LAB
CONTROL: PRESENT
PREGNANCY TEST URINE, POC: NEGATIVE

## 2024-12-05 PROCEDURE — G8420 CALC BMI NORM PARAMETERS: HCPCS | Performed by: STUDENT IN AN ORGANIZED HEALTH CARE EDUCATION/TRAINING PROGRAM

## 2024-12-05 PROCEDURE — 96413 CHEMO IV INFUSION 1 HR: CPT

## 2024-12-05 PROCEDURE — 99214 OFFICE O/P EST MOD 30 MIN: CPT | Performed by: STUDENT IN AN ORGANIZED HEALTH CARE EDUCATION/TRAINING PROGRAM

## 2024-12-05 PROCEDURE — 6360000002 HC RX W HCPCS: Performed by: STUDENT IN AN ORGANIZED HEALTH CARE EDUCATION/TRAINING PROGRAM

## 2024-12-05 PROCEDURE — 96361 HYDRATE IV INFUSION ADD-ON: CPT

## 2024-12-05 PROCEDURE — G8427 DOCREV CUR MEDS BY ELIG CLIN: HCPCS | Performed by: STUDENT IN AN ORGANIZED HEALTH CARE EDUCATION/TRAINING PROGRAM

## 2024-12-05 PROCEDURE — 1036F TOBACCO NON-USER: CPT | Performed by: STUDENT IN AN ORGANIZED HEALTH CARE EDUCATION/TRAINING PROGRAM

## 2024-12-05 PROCEDURE — 96375 TX/PRO/DX INJ NEW DRUG ADDON: CPT

## 2024-12-05 PROCEDURE — 2580000003 HC RX 258: Performed by: STUDENT IN AN ORGANIZED HEALTH CARE EDUCATION/TRAINING PROGRAM

## 2024-12-05 PROCEDURE — 96417 CHEMO IV INFUS EACH ADDL SEQ: CPT

## 2024-12-05 PROCEDURE — G8484 FLU IMMUNIZE NO ADMIN: HCPCS | Performed by: STUDENT IN AN ORGANIZED HEALTH CARE EDUCATION/TRAINING PROGRAM

## 2024-12-05 PROCEDURE — 81025 URINE PREGNANCY TEST: CPT | Performed by: STUDENT IN AN ORGANIZED HEALTH CARE EDUCATION/TRAINING PROGRAM

## 2024-12-05 RX ORDER — SODIUM CHLORIDE 9 MG/ML
INJECTION, SOLUTION INTRAVENOUS CONTINUOUS
Status: CANCELLED | OUTPATIENT
Start: 2024-12-05

## 2024-12-05 RX ORDER — HEPARIN SODIUM (PORCINE) LOCK FLUSH IV SOLN 100 UNIT/ML 100 UNIT/ML
500 SOLUTION INTRAVENOUS PRN
Status: CANCELLED | OUTPATIENT
Start: 2024-12-05

## 2024-12-05 RX ORDER — PALONOSETRON HYDROCHLORIDE 0.05 MG/ML
0.25 INJECTION, SOLUTION INTRAVENOUS ONCE
Status: COMPLETED | OUTPATIENT
Start: 2024-12-05 | End: 2024-12-05

## 2024-12-05 RX ORDER — EPINEPHRINE 1 MG/ML
0.3 INJECTION, SOLUTION, CONCENTRATE INTRAVENOUS PRN
Status: CANCELLED | OUTPATIENT
Start: 2024-12-05

## 2024-12-05 RX ORDER — SODIUM CHLORIDE 9 MG/ML
5-250 INJECTION, SOLUTION INTRAVENOUS PRN
Status: DISCONTINUED | OUTPATIENT
Start: 2024-12-05 | End: 2024-12-06 | Stop reason: HOSPADM

## 2024-12-05 RX ORDER — PALONOSETRON 0.05 MG/ML
0.25 INJECTION, SOLUTION INTRAVENOUS ONCE
Status: CANCELLED | OUTPATIENT
Start: 2024-12-05 | End: 2024-12-05

## 2024-12-05 RX ORDER — ONDANSETRON 2 MG/ML
8 INJECTION INTRAMUSCULAR; INTRAVENOUS
Status: CANCELLED | OUTPATIENT
Start: 2024-12-05

## 2024-12-05 RX ORDER — HYDROCORTISONE SODIUM SUCCINATE 100 MG/2ML
100 INJECTION INTRAMUSCULAR; INTRAVENOUS
Status: CANCELLED | OUTPATIENT
Start: 2024-12-05

## 2024-12-05 RX ORDER — DIPHENHYDRAMINE HYDROCHLORIDE 50 MG/ML
50 INJECTION INTRAMUSCULAR; INTRAVENOUS
Status: CANCELLED | OUTPATIENT
Start: 2024-12-05

## 2024-12-05 RX ORDER — SODIUM CHLORIDE 9 MG/ML
5-250 INJECTION, SOLUTION INTRAVENOUS PRN
Status: CANCELLED | OUTPATIENT
Start: 2024-12-05

## 2024-12-05 RX ORDER — SODIUM CHLORIDE 0.9 % (FLUSH) 0.9 %
5-40 SYRINGE (ML) INJECTION PRN
Status: CANCELLED | OUTPATIENT
Start: 2024-12-05

## 2024-12-05 RX ORDER — FAMOTIDINE 10 MG/ML
20 INJECTION, SOLUTION INTRAVENOUS
Status: CANCELLED | OUTPATIENT
Start: 2024-12-05

## 2024-12-05 RX ORDER — ACETAMINOPHEN 325 MG/1
650 TABLET ORAL
Status: CANCELLED | OUTPATIENT
Start: 2024-12-05

## 2024-12-05 RX ORDER — DEXAMETHASONE SODIUM PHOSPHATE 10 MG/ML
10 INJECTION, SOLUTION INTRAMUSCULAR; INTRAVENOUS ONCE
Status: COMPLETED | OUTPATIENT
Start: 2024-12-05 | End: 2024-12-05

## 2024-12-05 RX ORDER — MEPERIDINE HYDROCHLORIDE 50 MG/ML
12.5 INJECTION INTRAMUSCULAR; INTRAVENOUS; SUBCUTANEOUS PRN
Status: CANCELLED | OUTPATIENT
Start: 2024-12-05

## 2024-12-05 RX ORDER — ALBUTEROL SULFATE 90 UG/1
4 INHALANT RESPIRATORY (INHALATION) PRN
Status: CANCELLED | OUTPATIENT
Start: 2024-12-05

## 2024-12-05 RX ORDER — SODIUM CHLORIDE 0.9 % (FLUSH) 0.9 %
5-40 SYRINGE (ML) INJECTION PRN
Status: DISCONTINUED | OUTPATIENT
Start: 2024-12-05 | End: 2024-12-06 | Stop reason: HOSPADM

## 2024-12-05 RX ADMIN — SODIUM CHLORIDE 80 ML/HR: 9 INJECTION, SOLUTION INTRAVENOUS at 09:41

## 2024-12-05 RX ADMIN — DOCETAXEL ANHYDROUS 120 MG: 20 INJECTION, SOLUTION INTRAVENOUS at 10:08

## 2024-12-05 RX ADMIN — CYCLOPHOSPHAMIDE 960 MG: 200 INJECTION, SOLUTION INTRAVENOUS at 11:18

## 2024-12-05 RX ADMIN — PALONOSETRON 0.25 MG: 0.25 INJECTION, SOLUTION INTRAVENOUS at 09:45

## 2024-12-05 RX ADMIN — SODIUM CHLORIDE, PRESERVATIVE FREE 10 ML: 5 INJECTION INTRAVENOUS at 09:43

## 2024-12-05 RX ADMIN — DEXAMETHASONE SODIUM PHOSPHATE 10 MG: 10 INJECTION INTRAMUSCULAR; INTRAVENOUS at 09:42

## 2024-12-05 NOTE — TELEPHONE ENCOUNTER
Patient said she should be hearing back from her insurance company in 2-3 weeks. Her insurance was canceled, but she said it should be reinstated soon.

## 2024-12-05 NOTE — PROGRESS NOTES
Patient tolerated Taxotere/Cytoxan infusion well. Patient alert and oriented x 3 No distress noted. Patient denies any new or worsening pain. Patient denies questions regarding treatment or medication; verbalized understanding, offered patient information and discharge material; patient declined; Patient denies needs, all questions answered.

## 2024-12-05 NOTE — PROGRESS NOTES
[facility] PHYSICIANS [facility] MED ONCOLOGY  [address] [facility] [address]  Dept: [phone]  Loc: [phone]     Moon Lee MD   ·   MD Georgiana Mccollum APRN  ·  BELLA Torres           Hematology/Oncology   Clinic Progress Note                    Patient: Linda Bustamante,  38 y.o. female     Date of Encounter: 11/07/2024     Referring Provider:  Samra Hill MD     Reason for Visit:   RIGHT breast cancer, ER/MD+, HER2-, early stage           PCP:  Yolis Chandler MD            Chief Complaint   Patient presents with    Breast Cancer            Subjective:  -Patient is doing fine today. She reports having hot flushed and night sweat. Her last menstrual period was back in Oct. She denies back pain, SOB, abdominal pain.  -Right hand swelling has been improving.  -Appetite is good.          HPI from Initial Outpatient Consultation  (09/12/2024):  The patient is a 38 y.o. female who comes in for recent diagnosis of right sided breast cancer, having completed right mastectomy last month. She is doing well after surgery and is accompanied by her .      She denies of significant pain, fevers, chills or bleeding.   History of her breast cancer is outlined below, but in short, the patient had felt a palpable mass, prompting mammogram done through [facility] radiology.   She completed biopsy in June of this year, confirming her diagnosis of ER/MD positive, Her2 negative breast, and LN was negative for malignancy.      She then proceeded with [right] mastectomies as well as insertion of expanders.      Today, we discussed next steps in management.      ONCOLOGIC HISTORY:     Diagnosis/Biopsy - 6/6/24            -- Diagnosis --  A.  Right breast, 12 o'clock, core needle biopsy:  Invasive ductal carcinoma, moderately differentiated (see breast  cancer marker studies and     comment).  Focal benign fibroadenoma.  B.  Right axilla, core needle

## 2024-12-06 ENCOUNTER — HOSPITAL ENCOUNTER (OUTPATIENT)
Dept: OCCUPATIONAL THERAPY | Age: 39
Setting detail: THERAPIES SERIES
Discharge: HOME | End: 2024-12-06
Payer: COMMERCIAL

## 2024-12-06 ENCOUNTER — HOSPITAL ENCOUNTER (OUTPATIENT)
Dept: INFUSION THERAPY | Age: 39
Discharge: HOME | End: 2024-12-06
Payer: COMMERCIAL

## 2024-12-06 DIAGNOSIS — C50.911 INVASIVE DUCTAL CARCINOMA OF BREAST, FEMALE, RIGHT (HCC): Primary | ICD-10-CM

## 2024-12-06 PROCEDURE — 96372 THER/PROPH/DIAG INJ SC/IM: CPT

## 2024-12-06 PROCEDURE — 6360000002 HC RX W HCPCS: Performed by: STUDENT IN AN ORGANIZED HEALTH CARE EDUCATION/TRAINING PROGRAM

## 2024-12-06 PROCEDURE — 97140 MANUAL THERAPY 1/> REGIONS: CPT

## 2024-12-06 PROCEDURE — 97535 SELF CARE MNGMENT TRAINING: CPT

## 2024-12-06 RX ADMIN — PEGFILGRASTIM-JMDB 6 MG: 6 INJECTION SUBCUTANEOUS at 11:20

## 2024-12-06 NOTE — PROGRESS NOTES
Dry  []Rough []Moist []Rash  Location of problem area/s:  [] Wound: Location/Description   [x] Fibrosis: Location/Description: throughaxilla and along incision line.   [] Keratosis: Location/Description  [] Papillomas: Location/Description  [] Other    Color:  [] Normal [] Mottled [] Flushed [x] Other/Description  Location of problem area/s: Minimal color changes.     Edema:  Edema Location: right axilla  [x] 1+ Edema [] 2+ Edema  [] 3+ Edema [] 4+ Edema  Edema Location:  [] 1+ Edema [] 2+ Edema  [] 3+ Edema [] 4+ Edema  Edema Location:  [] 1+ Edema [] 2+ Edema  [] 3+ Edema [] 4+ Edema    Subjective: Patient eager to improve ROM and reduce swelling through area.     Objective:  [x] Measurement [x]Manual Lymph Drainage  []Bandaging   []Kinesiotaping [x] Education    [x]Self Massage   []Self Bandaging [x] Exercise    []Wound Care  [x]Hygiene  [x] Elevation    [] Other    Assessment:  Knowledge of home program:   [x] Good [] Fair  [] Poor  Patient is programming at home:             [x] Yes  [] No  Family is assisting with programming: [] Yes  [x] No  Home programming is consistent:             [x] Yes  [] No  Other:   Response to treatment: Improved knowledge and understanding to lymphedema and tightness through arm.   Instructions for patient:   [x] Verbal [x] Written  Instructions addressed: Patient educated throughout session on how the exercises, manipulation, and compression work together to reduce the swelling through chest. Will continue to wait on compression bra to maximize compression to chest.     Time In: 1000           Time Out: 1100                     Timed Code Treatment Minutes: 60 minutes      CODE  Minutes  Units   80799 OT Eval Low     79339 OT Eval Medium     39875 OT Eval High     45657 Fluidotherapy     85460 Manual 42 3   77116 Therapeutic Ex     00221 Therapeutic Activity     01155 ADL/COMP Tech Train 18 1   18553 Neuromuscular Re-Ed     52380 OrthoManagementTraining     34653 Paraffin

## 2024-12-10 ENCOUNTER — HOSPITAL ENCOUNTER (OUTPATIENT)
Dept: OCCUPATIONAL THERAPY | Age: 39
Setting detail: THERAPIES SERIES
Discharge: HOME | End: 2024-12-10
Payer: COMMERCIAL

## 2024-12-10 ENCOUNTER — TELEPHONE (OUTPATIENT)
Dept: OCCUPATIONAL THERAPY | Age: 39
End: 2024-12-10

## 2024-12-10 NOTE — TELEPHONE ENCOUNTER
OCCUPATIONAL THERAPY PROGRESS NOTE  Phone: [phone]  Fax: [phone]     Date: 12/10/2024  Patient Name: Linda Bustamante        : 1985         Patient called and cancelled appointment this date. Will continue to follow up on next appointment.             Phoebe ROME, CLT  [id number] Date: 12/10/2024

## 2024-12-10 NOTE — PROGRESS NOTES
OCCUPATIONAL THERAPY DAILY PROGRESS NOTE  Phone: [phone]  Fax: [phone]     Date: 12/10/2024  Patient Name: Linda Bustamante        : 1985         Patient called and cancelled appointment this date. Will continue to follow up on next appointment.           Phoebe ROME, CLT  [id number] Date: 12/10/2024

## 2024-12-13 ENCOUNTER — HOSPITAL ENCOUNTER (OUTPATIENT)
Dept: OCCUPATIONAL THERAPY | Age: 39
Setting detail: THERAPIES SERIES
Discharge: HOME | End: 2024-12-13
Payer: COMMERCIAL

## 2024-12-13 PROCEDURE — 97535 SELF CARE MNGMENT TRAINING: CPT

## 2024-12-13 PROCEDURE — 97535 SELF CARE MNGMENT TRAINING: CPT | Performed by: OCCUPATIONAL THERAPIST

## 2024-12-13 PROCEDURE — 97140 MANUAL THERAPY 1/> REGIONS: CPT

## 2024-12-13 NOTE — PROGRESS NOTES
of lymphedema and risk of infection.  Patient came into appointment this date feeling much better then last appointment after her last treatment of chemotherapy. Over the past week she has ate nothing but crackers, toast, and Gatorade because water did not taste good to her. Patient also stating she has not been able to complete the stretches this past week due to not feeling well but she plans to return to completing the stretches this weekend. Patient complained of very minimal soreness at her axilla into chest when completing ROM.  Will continue to work with patient to improve ROM and reduce swelling. Patient goal met. (5 mins).    2.  Patient will present with decreased limb volume in the involved extremity from minimal to trace for improved functional mobility.   Therapist educated patient on continuing to observe arm, axilla, and chest for any additional swelling over the next month and if there is more swelling then normal to call the office so she can come back in for further treatments. Therapist completed some manipulation and stretching throughout arm and into axilla to pull any excess fluid down into her hip and reduce any discomfort or heaviness felt.  Will continue to monitor right side and see how feelings progress throughout this period of time. Will continue to work with patient to improve reduction of fluid and improve functional movement through upper body. Goal partially met (25 mins).    3.  Patient will demonstrate compliance with compression therapy for independent management of lymphedema.  Patient arrived with a more fitting bra donned this date with improvement in coverage and comfort. Patient understands to take compression bra off while sleeping and apply compression bra through the day. Therapist and patient awaiting compression bra from [facility]. Will continue to work with patient to improve fit of compression. Goal partially met.  (3 minutes).    4.  Patient will increase active/passive 
required and that they will be provided while the patient is under my care.     Physician's Comments/Revisions:                   Physicians's Printed Name:  Prince Wheeler MD                                    Physician's Signature:                                                               Date:      Please review Patient's OT evaluation and if you agree sign/date and fax back to us at our [facility] MAIN OT fax [phone]. Thank you for your referral!

## 2024-12-24 ENCOUNTER — TELEPHONE (OUTPATIENT)
Dept: CASE MANAGEMENT | Age: 39
End: 2024-12-24

## 2024-12-24 NOTE — TELEPHONE ENCOUNTER
Patient called in stating that she \"thinks she has an infection in her mouth\" States that she believes that food got stuck under her dental implant and when she flossed her cap came off. She states that her gums are sore and irritated along with pus when she pushed on it. She is wondering if Dr Wheeler would be able to order a antibiotic. I instructed patient that Dr Wheeler is our for the rest of the week and that she should contact her dentist for follow up. Patient verbalizes understanding and states that she will call her dentist.

## 2024-12-26 ENCOUNTER — TELEPHONE (OUTPATIENT)
Dept: SURGERY | Age: 39
End: 2024-12-26

## 2024-12-26 NOTE — TELEPHONE ENCOUNTER
Patient called and said she has same insurance as before. She said she had some issues before but everything has been worked out and it is the same insurance she had when she was here.

## 2025-01-06 ENCOUNTER — TELEPHONE (OUTPATIENT)
Dept: INFUSION THERAPY | Age: 40
End: 2025-01-06

## 2025-01-06 NOTE — TELEPHONE ENCOUNTER
Dr Wheeler returned message. Patient to go to urgent care or PCP. Patient aware and states understanding

## 2025-01-06 NOTE — TELEPHONE ENCOUNTER
Patient has called and stated that her left ear to forehead is swollen. She had noticed this 5-6 days ago. This nurse reached out to Dr Wheeler for further instructions. Patient has clinic visit 1/9/25.

## 2025-01-08 ENCOUNTER — APPOINTMENT (OUTPATIENT)
Dept: OCCUPATIONAL THERAPY | Age: 40
End: 2025-01-08
Payer: COMMERCIAL

## 2025-01-09 ENCOUNTER — OFFICE VISIT (OUTPATIENT)
Dept: ONCOLOGY | Age: 40
End: 2025-01-09
Payer: COMMERCIAL

## 2025-01-09 ENCOUNTER — HOSPITAL ENCOUNTER (OUTPATIENT)
Dept: INFUSION THERAPY | Age: 40
Discharge: HOME | End: 2025-01-09
Payer: COMMERCIAL

## 2025-01-09 VITALS
TEMPERATURE: 97.7 F | SYSTOLIC BLOOD PRESSURE: 110 MMHG | BODY MASS INDEX: 24.11 KG/M2 | HEIGHT: 62 IN | OXYGEN SATURATION: 100 % | WEIGHT: 131 LBS | HEART RATE: 86 BPM | RESPIRATION RATE: 16 BRPM | DIASTOLIC BLOOD PRESSURE: 70 MMHG

## 2025-01-09 DIAGNOSIS — C50.911 INVASIVE DUCTAL CARCINOMA OF BREAST, FEMALE, RIGHT (HCC): Primary | ICD-10-CM

## 2025-01-09 DIAGNOSIS — Z17.0 MALIGNANT NEOPLASM OF CENTRAL PORTION OF RIGHT BREAST IN FEMALE, ESTROGEN RECEPTOR POSITIVE (HCC): ICD-10-CM

## 2025-01-09 DIAGNOSIS — C50.111 MALIGNANT NEOPLASM OF CENTRAL PORTION OF RIGHT BREAST IN FEMALE, ESTROGEN RECEPTOR POSITIVE (HCC): ICD-10-CM

## 2025-01-09 LAB
ALBUMIN SERPL-MCNC: 4.8 G/DL (ref 3.5–5.2)
ALP SERPL-CCNC: 103 U/L (ref 35–104)
ALT SERPL-CCNC: 14 U/L (ref 0–32)
ANION GAP SERPL CALCULATED.3IONS-SCNC: 12 MMOL/L (ref 7–16)
AST SERPL-CCNC: 18 U/L (ref 0–31)
BASOPHILS # BLD: 0.05 K/UL (ref 0–0.2)
BASOPHILS NFR BLD: 1 % (ref 0–2)
BILIRUB SERPL-MCNC: 0.3 MG/DL (ref 0–1.2)
BUN SERPL-MCNC: 20 MG/DL (ref 6–20)
CALCIUM SERPL-MCNC: 9.3 MG/DL (ref 8.6–10.2)
CHLORIDE SERPL-SCNC: 108 MMOL/L (ref 98–107)
CO2 SERPL-SCNC: 24 MMOL/L (ref 22–29)
CREAT SERPL-MCNC: 0.8 MG/DL (ref 0.5–1)
EOSINOPHIL # BLD: 0.17 K/UL (ref 0.05–0.5)
EOSINOPHILS RELATIVE PERCENT: 4 % (ref 0–6)
ERYTHROCYTE [DISTWIDTH] IN BLOOD BY AUTOMATED COUNT: 13.2 % (ref 11.5–15)
GFR, ESTIMATED: >90 ML/MIN/1.73M2
GLUCOSE SERPL-MCNC: 100 MG/DL (ref 74–99)
HCT VFR BLD AUTO: 42.6 % (ref 34–48)
HGB BLD-MCNC: 13.8 G/DL (ref 11.5–15.5)
IMM GRANULOCYTES # BLD AUTO: <0.03 K/UL (ref 0–0.58)
IMM GRANULOCYTES NFR BLD: 0 % (ref 0–5)
LYMPHOCYTES NFR BLD: 1.23 K/UL (ref 1.5–4)
LYMPHOCYTES RELATIVE PERCENT: 30 % (ref 20–42)
MCH RBC QN AUTO: 31.9 PG (ref 26–35)
MCHC RBC AUTO-ENTMCNC: 32.4 G/DL (ref 32–34.5)
MCV RBC AUTO: 98.6 FL (ref 80–99.9)
MONOCYTES NFR BLD: 0.18 K/UL (ref 0.1–0.95)
MONOCYTES NFR BLD: 4 % (ref 2–12)
NEUTROPHILS NFR BLD: 60 % (ref 43–80)
NEUTS SEG NFR BLD: 2.42 K/UL (ref 1.8–7.3)
PLATELET # BLD AUTO: 244 K/UL (ref 130–450)
PMV BLD AUTO: 11.2 FL (ref 7–12)
POTASSIUM SERPL-SCNC: 3.9 MMOL/L (ref 3.5–5)
PROT SERPL-MCNC: 7.2 G/DL (ref 6.4–8.3)
RBC # BLD AUTO: 4.32 M/UL (ref 3.5–5.5)
SODIUM SERPL-SCNC: 144 MMOL/L (ref 132–146)
WBC OTHER # BLD: 4.1 K/UL (ref 4.5–11.5)

## 2025-01-09 PROCEDURE — 85025 COMPLETE CBC W/AUTO DIFF WBC: CPT

## 2025-01-09 PROCEDURE — G8427 DOCREV CUR MEDS BY ELIG CLIN: HCPCS | Performed by: STUDENT IN AN ORGANIZED HEALTH CARE EDUCATION/TRAINING PROGRAM

## 2025-01-09 PROCEDURE — 36415 COLL VENOUS BLD VENIPUNCTURE: CPT

## 2025-01-09 PROCEDURE — G8420 CALC BMI NORM PARAMETERS: HCPCS | Performed by: STUDENT IN AN ORGANIZED HEALTH CARE EDUCATION/TRAINING PROGRAM

## 2025-01-09 PROCEDURE — 1036F TOBACCO NON-USER: CPT | Performed by: STUDENT IN AN ORGANIZED HEALTH CARE EDUCATION/TRAINING PROGRAM

## 2025-01-09 PROCEDURE — 99213 OFFICE O/P EST LOW 20 MIN: CPT | Performed by: STUDENT IN AN ORGANIZED HEALTH CARE EDUCATION/TRAINING PROGRAM

## 2025-01-09 PROCEDURE — 80053 COMPREHEN METABOLIC PANEL: CPT

## 2025-01-09 RX ORDER — TAMOXIFEN CITRATE 20 MG/1
20 TABLET ORAL DAILY
Qty: 30 TABLET | Refills: 0 | Status: SHIPPED | OUTPATIENT
Start: 2025-01-09

## 2025-01-09 RX ORDER — ASPIRIN 81 MG/1
81 TABLET ORAL DAILY
Qty: 90 TABLET | Refills: 0 | Status: SHIPPED | OUTPATIENT
Start: 2025-01-09

## 2025-01-09 NOTE — PROGRESS NOTES
[facility] MED ONCOLOGY  [address] [facility] [address]  Dept: [phone]  Loc: [phone]    Moon Lee MD   ·   MD Georgiana Mccollum APRN  ·  BELLA Torres        Hematology/Oncology   Clinic Progress Note              Patient: Lidna Bustamante,  39 y.o. female    Date of Encounter: 01/09/2025     Referring Provider:  Samra Hill MD    Reason for Visit:   RIGHT breast cancer, ER/GA+, HER2-, early stage        PCP:  Yolis Chandler MD      Chief Complaint   Patient presents with    Breast Cancer         Subjective:  Completed course of adjuvant TC, tolerated acceptably.   She is doing well.   C/o mild swelling at left temple, and is without pain. May fluctuate in size.   Unsure of exacerbating factors.        HPI from Initial Outpatient Consultation  (09/12/2024):  The patient is a 39 y.o. female who comes in for recent diagnosis of right sided breast cancer, having completed bilateral mastectomies last month. She is doing well after surgery and is accompanied by her .     She denies of significant pain, fevers, chills or bleeding.   History of her breast cancer is outlined below, but in short, the patient had felt a palpable mass, prompting mammogram done through [facility] radiology.   She completed biopsy in June of this year, confirming her diagnosis of ER/GA positive, Her2 negative breast, and LN was negative for malignancy.     She then proceeded with [right] mastectomies as well as insertion of expanders.     Today, we discussed next steps in management.     ONCOLOGIC HISTORY:    Diagnosis/Biopsy - 6/6/24          -- Diagnosis --  A.  Right breast, 12 o'clock, core needle biopsy:  Invasive ductal carcinoma, moderately differentiated (see breast  cancer marker studies and     comment).  Focal benign fibroadenoma.  B.  Right axilla, core needle biopsy:  Benign lymph node and adipose tissue

## 2025-01-13 ENCOUNTER — TELEPHONE (OUTPATIENT)
Dept: CASE MANAGEMENT | Age: 40
End: 2025-01-13

## 2025-01-13 NOTE — TELEPHONE ENCOUNTER
Patient called about clearance for breast surgery with Dr Juarez plastic surgeon. Patient states that their office is requesting clearance from Dr Wheeler to proceed with surgery. Clarified with Dr Wheeler that patient is cleared to proceed with surgery from his standpoint. Will update Dr Juarez office of above.

## 2025-01-15 ENCOUNTER — PREP FOR PROCEDURE (OUTPATIENT)
Dept: SURGERY | Age: 40
End: 2025-01-15

## 2025-01-15 ENCOUNTER — HOSPITAL ENCOUNTER (OUTPATIENT)
Dept: OCCUPATIONAL THERAPY | Age: 40
Setting detail: THERAPIES SERIES
Discharge: HOME | End: 2025-01-15
Payer: COMMERCIAL

## 2025-01-15 PROBLEM — C50.911 CANCER OF RIGHT BREAST (HCC): Status: ACTIVE | Noted: 2025-01-15

## 2025-01-15 PROCEDURE — 97165 OT EVAL LOW COMPLEX 30 MIN: CPT | Performed by: OCCUPATIONAL THERAPIST

## 2025-01-15 NOTE — PROGRESS NOTES
Coordination   []Balance   []Vision/perception  []Sensation []Gross Motor Coordination  [x]ROM     OT PLAN OF CARE   OT POC based on physician orders, patient diagnosis and results of clinical assessment    Frequency/Duration:  1-2x per week for 12 treatment sessions from 15Jan. 2025 through 9Apr. 2025   Specific OT Treatment to include:     Plan of Care:     [x]97140-Manual Lymph Drainage and Combined Decongestive Therapy  [x]54746- Skilled Multilayer Short Stretch Compression Bandaging/ Therapeutic Exercise  [x]Skin Care Education [x]HEP including Self MLD Education and/or Self Bandaging  [x]Education for Lymphedema Risks/Precautions     [x] Caregiver Education   []other:      Patient Specific Goal: to make it stop    Goals Status:    STG: 3 weeks   Patient will demonstrate knowledge and understanding for lymphedema precautions, skin care and self management to decrease progression of lymphedema and risk of infection.  Patient goal met.       2.  Patient will present with decreased limb volume in the involved extremity from minimal to trace for improved functional mobility.   Therapist continued patient education regarding manual lympathic drainage massage.  Therapist discussed current edema symptoms for effected areas.  Patient does not note any heaviness or discomfort of upper extremity.  Patient also no longer feels heaviness along flank.  Patient educated on need to manage fluid if it does return along flank or if new symptoms are noted in upper extremity.  Patient able to verbalize understanding.  Patient to have final reconstruction surgery Feb. 2025 and therapist to follow up as needed if new symptoms arise.  Patient measurements taken and recorded below.  Patient does not exhibit swelling at this time.  Patient goal met.  (Total:  10min)     3.  Patient will demonstrate compliance with compression therapy for independent management of lymphedema.  Therapist continued patient education regarding compression

## 2025-01-28 NOTE — H&P
Department of Plastic Surgery - Adult  Attending Consult Note      CHIEF COMPLAINT:   history of breast cancer    History Obtained From:  patient    HISTORY OF PRESENT ILLNESS:                The patient is a 39 y.o. female who presents for Follow up today from  Right breast mastectomy with sentinel lymph node biopsy - Right, RIGHT BREAST IMPLANT/TISSUE EXPANDER INSERTION with expander, and alloderm   stage 1 - Right, and fIRST STAGE RIGHT BREAST RECONSTRUCTION USING TISSUE EXPANDER AND ALLODERM - Right . Denies fever, nausea, vomiting, leg pain or swelling, pain is absent.  She is ambulating at home.  She is  wearing her surgical bra at all times.   She presents today with her .      Past Medical History:    Past Medical History:   Diagnosis Date    Hypothyroidism     Invasive ductal carcinoma of right breast (HCC) 06/12/2024    Migraine     Seasonal allergies     Thyroid disease      Past Surgical History:    Past Surgical History:   Procedure Laterality Date    BREAST ENHANCEMENT SURGERY Right 8/13/2024    RIGHT BREAST IMPLANT/TISSUE EXPANDER INSERTION with expander, and alloderm   stage 1 performed by Jack Juarez MD at [facility] OR    BREAST RECONSTRUCTION Right 8/13/2024    fIRST STAGE RIGHT BREAST RECONSTRUCTION USING TISSUE EXPANDER AND ALLODERM performed by Jack Juarez MD at [facility] OR    [facility] NEEDLE BIOPSY LYMPH NODE SUPERFICIAL  6/6/2024    MARIO NEEDLE BIOPSY LYMPH NODE SUPERFICIAL 6/6/2024 [facility]    MARIO US GUIDED PLACE LOC DEVICE PERC 1ST LESION  6/6/2024    MARIO US PERQ DEVICE SOFT TISSUE PLMT  FIRST LESION 6/6/2024 [facility]    MASTECTOMY Right 8/13/2024    Right breast mastectomy with sentinel lymph node biopsy performed by Samra Hill MD at [facility] OR    TONSILLECTOMY Bilateral     US BREAST BIOPSY W LOC DEVICE 1ST LESION RIGHT Right 6/6/2024    US BREAST BIOPSY W LOC DEVICE 1ST LESION RIGHT 6/6/2024 [facility]     Current Medications:   No current facility-administered  solid  types, with focal central necrosis.    Prior biopsy cavity is identified.    Margins of excision are negative for DCIS or malignancy.    Fibrocystic changes and sclerosing adenosis are seen.     B.          [place] Lymph Node # 1:         One lymph node, negative for metastasis (0/1).     C.          [place] Lymph Node # 2:         One lymph node, negative for metastasis (0/1).    Comment:  Intradepartmental consultation is obtained.         Synoptic Checklist:       Case Summary: (Invasive Carcinoma of The Breast: Resection)  Standard(s): AJCC-UICC 8  Procedure: Total mastectomy  Specimen Laterality:Right  Tumor Site:12 o'clock  Histologic Type:Invasive carcinoma of no special type (ductal)  Histologic Grade (Dany Histologic Score)       Glandular (Acinar) / Tubular Differentiation: Score 3  Nuclear Pleomorphism: Score 3  Mitotic Rate: Score 1  Overall Grade: Grade 2 (scores of 7)       Tumor Size:       Greatest Dimension of Largest Invasive Focus: 28 mm  Additional Dimension in Millimeters (mm): 21 x 20 mm       Tumor Focality: Single focus of invasive carcinoma       Number of Foci: 1       Ductal Carcinoma In Situ (DCIS): Present       Extensive intraductal component (EIC): Negative  Architectural Patterns: Cribriform and solid.  Nuclear Grade: Grade II (intermediate)  Necrosis: Present, central  Number of Blocks with DCIS: 2  Number of Blocks Examined: 16       Lobular Carcinoma In Situ (LCIS): Not identified  Tumor Extent: Not applicable (skin and nipple are uninvolved)  Lymphatic and / or Vascular Invasion: Present, focal (LVI in one block  only)  Dermal Lymphatic and / or Vascular Invasion: Not identified  Microcalcifications: Present in invasive carcinoma  Treatment Effect in the Breast: No known presurgical therapy  Treatment Effect in the Lymph Nodes: Not applicable  Residual Cancer [place] (RCB) Calculation: Not reported    Margin Status for Invasive Carcinoma: All margins negative

## 2025-01-30 ENCOUNTER — TELEPHONE (OUTPATIENT)
Dept: CASE MANAGEMENT | Age: 40
End: 2025-01-30

## 2025-01-30 NOTE — PROGRESS NOTES
[facility]   PRE-ADMISSION TESTING GENERAL INSTRUCTIONS  PAT Phone Number: [phone]      GENERAL INSTRUCTIONS:    [x] Antibacterial Soap Shower Night before AND the Morning of procedure.  [x] Do not wear makeup, lotions, powders, deodorant the morning of surgery.  [x] No solid food after midnight. You may have SIPS of clear liquids up until 2 hours before your arrival time to the hospital.   [x] You may brush your teeth, gargle, but do not swallow water.   [x] No tobacco products, illegal drugs, or alcohol within 24 hours of your surgery.  [x] Jewelry or valuables should not be brought to the hospital. All body and/or tongue piercing's must be removed prior to arriving to hospital. No contact lens or hair pins.   [x] Arrange transportation with a responsible adult  to and from the hospital. Arrange for someone to be with you for the remainder of the day and for 24 hours after your procedure due to having had anesthesia.          -Who will be your  for transportation?         -Who will be staying with you for 24 hrs after your procedure?   [x] Bring insurance card and photo ID.  [x] Urine Pregnancy test will be preformed the day of surgery. A specimen sample may be brought from home.     PARKING INSTRUCTIONS:     [x] ARRIVAL DATE & TIME: 2/6 at 0700  [x] Times are subject to change. We will contact you the business day before surgery if that were to occur.  [x] Enter into the [facility] Entrance. Two people may accompany you. Masks are not required.  [x] Parking Lot \"I\" is where you will park. It is located on the corner of [address] and [address]. The entrance is on [address].   Only one vehicle - per patient, is permitted in parking lot.   Upon entering the parking lot, a voucher ticket will print.    EDUCATION INSTRUCTIONS:           [] [facility] Pamphlet placed in chart.  [] Pre-admission Testing educational

## 2025-01-30 NOTE — TELEPHONE ENCOUNTER
Patient called in and left message that she had received a message from someone at the hospital that she would have to check with oncology about Lovenox. Patient is on baby aspirin (prophylactic), due to Tamoxifen use. I called patient to confirm she has stopped Tamoxifen and baby aspirin.Patient states she stopped both medications yesterday 1-29-25. Patient surgery is scheduled for 2-6-2025. After speaking with Dr Wheeler patient does not need to be on Lovenox. Patient voiced understanding and is appreciative of instructions.

## 2025-02-05 ENCOUNTER — ANESTHESIA EVENT (OUTPATIENT)
Dept: OPERATING ROOM | Age: 40
End: 2025-02-05
Payer: COMMERCIAL

## 2025-02-06 ENCOUNTER — ANESTHESIA (OUTPATIENT)
Dept: OPERATING ROOM | Age: 40
End: 2025-02-06
Payer: COMMERCIAL

## 2025-02-06 ENCOUNTER — HOSPITAL ENCOUNTER (OUTPATIENT)
Age: 40
Setting detail: OUTPATIENT SURGERY
Discharge: HOME | End: 2025-02-06
Attending: PLASTIC SURGERY | Admitting: PLASTIC SURGERY
Payer: COMMERCIAL

## 2025-02-06 VITALS
SYSTOLIC BLOOD PRESSURE: 108 MMHG | HEART RATE: 81 BPM | BODY MASS INDEX: 23.78 KG/M2 | RESPIRATION RATE: 17 BRPM | WEIGHT: 130 LBS | DIASTOLIC BLOOD PRESSURE: 74 MMHG | OXYGEN SATURATION: 98 % | TEMPERATURE: 97.6 F

## 2025-02-06 DIAGNOSIS — G89.18 POST-OP PAIN: ICD-10-CM

## 2025-02-06 DIAGNOSIS — Z01.812 PRE-OPERATIVE LABORATORY EXAMINATION: Primary | ICD-10-CM

## 2025-02-06 DIAGNOSIS — C50.911: ICD-10-CM

## 2025-02-06 LAB
ERYTHROCYTE [DISTWIDTH] IN BLOOD BY AUTOMATED COUNT: 12 % (ref 11.5–15)
HCG, URINE, POC: NEGATIVE
HCT VFR BLD AUTO: 41.7 % (ref 34–48)
HGB BLD-MCNC: 14 G/DL (ref 11.5–15.5)
Lab: NORMAL
MCH RBC QN AUTO: 32.6 PG (ref 26–35)
MCHC RBC AUTO-ENTMCNC: 33.6 G/DL (ref 32–34.5)
MCV RBC AUTO: 97 FL (ref 80–99.9)
NEGATIVE QC PASS/FAIL: NORMAL
PLATELET # BLD AUTO: 225 K/UL (ref 130–450)
PMV BLD AUTO: 11.1 FL (ref 7–12)
POSITIVE QC PASS/FAIL: NORMAL
RBC # BLD AUTO: 4.3 M/UL (ref 3.5–5.5)
WBC OTHER # BLD: 4.1 K/UL (ref 4.5–11.5)

## 2025-02-06 PROCEDURE — 3700000000 HC ANESTHESIA ATTENDED CARE: Performed by: PLASTIC SURGERY

## 2025-02-06 PROCEDURE — 2580000003 HC RX 258

## 2025-02-06 PROCEDURE — 88305 TISSUE EXAM BY PATHOLOGIST: CPT

## 2025-02-06 PROCEDURE — 2500000003 HC RX 250 WO HCPCS: Performed by: PLASTIC SURGERY

## 2025-02-06 PROCEDURE — 7100000010 HC PHASE II RECOVERY - FIRST 15 MIN: Performed by: PLASTIC SURGERY

## 2025-02-06 PROCEDURE — 85027 COMPLETE CBC AUTOMATED: CPT

## 2025-02-06 PROCEDURE — 6360000002 HC RX W HCPCS: Performed by: PLASTIC SURGERY

## 2025-02-06 PROCEDURE — 6370000000 HC RX 637 (ALT 250 FOR IP): Performed by: ANESTHESIOLOGY

## 2025-02-06 PROCEDURE — 7100000011 HC PHASE II RECOVERY - ADDTL 15 MIN: Performed by: PLASTIC SURGERY

## 2025-02-06 PROCEDURE — 2580000003 HC RX 258: Performed by: PLASTIC SURGERY

## 2025-02-06 PROCEDURE — 11970 RPLCMT TISS XPNDR PERM IMPLT: CPT | Performed by: PLASTIC SURGERY

## 2025-02-06 PROCEDURE — 2720000010 HC SURG SUPPLY STERILE: Performed by: PLASTIC SURGERY

## 2025-02-06 PROCEDURE — 2709999900 HC NON-CHARGEABLE SUPPLY: Performed by: PLASTIC SURGERY

## 2025-02-06 PROCEDURE — C1789 PROSTHESIS, BREAST, IMP: HCPCS | Performed by: PLASTIC SURGERY

## 2025-02-06 PROCEDURE — 7100000001 HC PACU RECOVERY - ADDTL 15 MIN: Performed by: PLASTIC SURGERY

## 2025-02-06 PROCEDURE — 2500000003 HC RX 250 WO HCPCS: Performed by: ANESTHESIOLOGY

## 2025-02-06 PROCEDURE — 6360000002 HC RX W HCPCS

## 2025-02-06 PROCEDURE — 3600000005 HC SURGERY LEVEL 5 BASE: Performed by: PLASTIC SURGERY

## 2025-02-06 PROCEDURE — 7100000000 HC PACU RECOVERY - FIRST 15 MIN: Performed by: PLASTIC SURGERY

## 2025-02-06 PROCEDURE — 19316 MASTOPEXY: CPT | Performed by: PLASTIC SURGERY

## 2025-02-06 PROCEDURE — 3600000015 HC SURGERY LEVEL 5 ADDTL 15MIN: Performed by: PLASTIC SURGERY

## 2025-02-06 PROCEDURE — 3700000001 HC ADD 15 MINUTES (ANESTHESIA): Performed by: PLASTIC SURGERY

## 2025-02-06 DEVICE — SMOOTH MODERATE PLUS PROFILE, 320CC  SMOOTH ROUND SILICONE
Type: IMPLANTABLE DEVICE | Site: BREAST | Status: FUNCTIONAL
Brand: MENTOR MEMORYGEL BOOST BREAST IMPLANT

## 2025-02-06 RX ORDER — CLINDAMYCIN HYDROCHLORIDE 300 MG/1
300 CAPSULE ORAL 3 TIMES DAILY
Qty: 15 CAPSULE | Refills: 0 | Status: SHIPPED | OUTPATIENT
Start: 2025-02-06 | End: 2025-02-11

## 2025-02-06 RX ORDER — BUPIVACAINE HYDROCHLORIDE 2.5 MG/ML
INJECTION, SOLUTION EPIDURAL; INFILTRATION; INTRACAUDAL PRN
Status: DISCONTINUED | OUTPATIENT
Start: 2025-02-06 | End: 2025-02-06 | Stop reason: ALTCHOICE

## 2025-02-06 RX ORDER — SODIUM CHLORIDE 0.9 % (FLUSH) 0.9 %
5-40 SYRINGE (ML) INJECTION EVERY 12 HOURS SCHEDULED
Status: DISCONTINUED | OUTPATIENT
Start: 2025-02-06 | End: 2025-02-06 | Stop reason: HOSPADM

## 2025-02-06 RX ORDER — LIDOCAINE HYDROCHLORIDE 20 MG/ML
INJECTION, SOLUTION INTRAVENOUS
Status: DISCONTINUED | OUTPATIENT
Start: 2025-02-06 | End: 2025-02-06 | Stop reason: SDUPTHER

## 2025-02-06 RX ORDER — SODIUM CHLORIDE 0.9 % (FLUSH) 0.9 %
5-40 SYRINGE (ML) INJECTION PRN
Status: DISCONTINUED | OUTPATIENT
Start: 2025-02-06 | End: 2025-02-06 | Stop reason: HOSPADM

## 2025-02-06 RX ORDER — HYDROCODONE BITARTRATE AND ACETAMINOPHEN 5; 325 MG/1; MG/1
1 TABLET ORAL EVERY 4 HOURS PRN
Qty: 20 TABLET | Refills: 0 | Status: SHIPPED | OUTPATIENT
Start: 2025-02-06 | End: 2025-02-13

## 2025-02-06 RX ORDER — FENTANYL CITRATE 50 UG/ML
INJECTION, SOLUTION INTRAMUSCULAR; INTRAVENOUS
Status: DISCONTINUED | OUTPATIENT
Start: 2025-02-06 | End: 2025-02-06 | Stop reason: SDUPTHER

## 2025-02-06 RX ORDER — DEXAMETHASONE SODIUM PHOSPHATE 10 MG/ML
INJECTION, SOLUTION INTRAMUSCULAR; INTRAVENOUS
Status: DISCONTINUED | OUTPATIENT
Start: 2025-02-06 | End: 2025-02-06 | Stop reason: SDUPTHER

## 2025-02-06 RX ORDER — SODIUM CHLORIDE 9 MG/ML
INJECTION, SOLUTION INTRAVENOUS
Status: DISCONTINUED | OUTPATIENT
Start: 2025-02-06 | End: 2025-02-06 | Stop reason: SDUPTHER

## 2025-02-06 RX ORDER — ONDANSETRON 4 MG/1
4 TABLET, FILM COATED ORAL DAILY PRN
Qty: 12 TABLET | Refills: 1 | Status: SHIPPED | OUTPATIENT
Start: 2025-02-06

## 2025-02-06 RX ORDER — ACETAMINOPHEN 500 MG
1000 TABLET ORAL ONCE
Status: COMPLETED | OUTPATIENT
Start: 2025-02-06 | End: 2025-02-06

## 2025-02-06 RX ORDER — DIPHENHYDRAMINE HYDROCHLORIDE 50 MG/ML
12.5 INJECTION INTRAMUSCULAR; INTRAVENOUS
Status: DISCONTINUED | OUTPATIENT
Start: 2025-02-06 | End: 2025-02-06 | Stop reason: HOSPADM

## 2025-02-06 RX ORDER — HYDROCODONE BITARTRATE AND ACETAMINOPHEN 5; 325 MG/1; MG/1
1 TABLET ORAL
Status: COMPLETED | OUTPATIENT
Start: 2025-02-06 | End: 2025-02-06

## 2025-02-06 RX ORDER — PROPOFOL 10 MG/ML
INJECTION, EMULSION INTRAVENOUS
Status: DISCONTINUED | OUTPATIENT
Start: 2025-02-06 | End: 2025-02-06 | Stop reason: SDUPTHER

## 2025-02-06 RX ORDER — HYDROMORPHONE HYDROCHLORIDE 1 MG/ML
0.25 INJECTION, SOLUTION INTRAMUSCULAR; INTRAVENOUS; SUBCUTANEOUS EVERY 5 MIN PRN
Status: COMPLETED | OUTPATIENT
Start: 2025-02-06 | End: 2025-02-06

## 2025-02-06 RX ORDER — NALOXONE HYDROCHLORIDE 0.4 MG/ML
INJECTION, SOLUTION INTRAMUSCULAR; INTRAVENOUS; SUBCUTANEOUS PRN
Status: DISCONTINUED | OUTPATIENT
Start: 2025-02-06 | End: 2025-02-06 | Stop reason: HOSPADM

## 2025-02-06 RX ORDER — OXYCODONE HYDROCHLORIDE 5 MG/1
10 TABLET ORAL PRN
Status: DISCONTINUED | OUTPATIENT
Start: 2025-02-06 | End: 2025-02-06 | Stop reason: HOSPADM

## 2025-02-06 RX ORDER — OXYCODONE HYDROCHLORIDE 5 MG/1
5 TABLET ORAL PRN
Status: DISCONTINUED | OUTPATIENT
Start: 2025-02-06 | End: 2025-02-06 | Stop reason: HOSPADM

## 2025-02-06 RX ORDER — SODIUM CHLORIDE 9 MG/ML
INJECTION, SOLUTION INTRAVENOUS PRN
Status: DISCONTINUED | OUTPATIENT
Start: 2025-02-06 | End: 2025-02-06 | Stop reason: HOSPADM

## 2025-02-06 RX ORDER — ONDANSETRON 2 MG/ML
INJECTION INTRAMUSCULAR; INTRAVENOUS
Status: DISCONTINUED | OUTPATIENT
Start: 2025-02-06 | End: 2025-02-06 | Stop reason: SDUPTHER

## 2025-02-06 RX ORDER — SODIUM CHLORIDE 9 MG/ML
INJECTION, SOLUTION INTRAVENOUS CONTINUOUS
Status: DISCONTINUED | OUTPATIENT
Start: 2025-02-06 | End: 2025-02-06 | Stop reason: HOSPADM

## 2025-02-06 RX ORDER — GLYCOPYRROLATE 1 MG/5 ML
SYRINGE (ML) INTRAVENOUS
Status: DISCONTINUED | OUTPATIENT
Start: 2025-02-06 | End: 2025-02-06 | Stop reason: SDUPTHER

## 2025-02-06 RX ORDER — MEPERIDINE HYDROCHLORIDE 25 MG/ML
12.5 INJECTION INTRAMUSCULAR; INTRAVENOUS; SUBCUTANEOUS EVERY 5 MIN PRN
Status: DISCONTINUED | OUTPATIENT
Start: 2025-02-06 | End: 2025-02-06 | Stop reason: HOSPADM

## 2025-02-06 RX ORDER — HYDROMORPHONE HYDROCHLORIDE 1 MG/ML
0.5 INJECTION, SOLUTION INTRAMUSCULAR; INTRAVENOUS; SUBCUTANEOUS EVERY 5 MIN PRN
Status: DISCONTINUED | OUTPATIENT
Start: 2025-02-06 | End: 2025-02-06 | Stop reason: HOSPADM

## 2025-02-06 RX ORDER — MIDAZOLAM HYDROCHLORIDE 1 MG/ML
INJECTION, SOLUTION INTRAMUSCULAR; INTRAVENOUS
Status: DISCONTINUED | OUTPATIENT
Start: 2025-02-06 | End: 2025-02-06 | Stop reason: SDUPTHER

## 2025-02-06 RX ADMIN — HYDROCODONE BITARTRATE AND ACETAMINOPHEN 1 TABLET: 5; 325 TABLET ORAL at 13:24

## 2025-02-06 RX ADMIN — MIDAZOLAM 2 MG: 1 INJECTION INTRAMUSCULAR; INTRAVENOUS at 10:33

## 2025-02-06 RX ADMIN — CEFAZOLIN 2000 MG: 2 INJECTION, POWDER, FOR SOLUTION INTRAMUSCULAR; INTRAVENOUS at 10:47

## 2025-02-06 RX ADMIN — HYDROMORPHONE HYDROCHLORIDE 0.25 MG: 1 INJECTION, SOLUTION INTRAMUSCULAR; INTRAVENOUS; SUBCUTANEOUS at 12:34

## 2025-02-06 RX ADMIN — PROPOFOL 200 MG: 10 INJECTION, EMULSION INTRAVENOUS at 10:41

## 2025-02-06 RX ADMIN — SODIUM CHLORIDE: 0.9 INJECTION, SOLUTION INTRAVENOUS at 07:42

## 2025-02-06 RX ADMIN — ONDANSETRON 4 MG: 2 INJECTION, SOLUTION INTRAMUSCULAR; INTRAVENOUS at 11:31

## 2025-02-06 RX ADMIN — ACETAMINOPHEN 1000 MG: 500 TABLET ORAL at 07:38

## 2025-02-06 RX ADMIN — Medication 0.2 MG: at 10:49

## 2025-02-06 RX ADMIN — SODIUM CHLORIDE: 9 INJECTION, SOLUTION INTRAVENOUS at 09:59

## 2025-02-06 RX ADMIN — PHENYLEPHRINE HYDROCHLORIDE 100 MCG: 10 INJECTION INTRAVENOUS at 10:47

## 2025-02-06 RX ADMIN — LIDOCAINE HYDROCHLORIDE 80 MG: 20 INJECTION, SOLUTION INTRAVENOUS at 10:41

## 2025-02-06 RX ADMIN — FENTANYL CITRATE 100 MCG: 50 INJECTION, SOLUTION INTRAMUSCULAR; INTRAVENOUS at 10:41

## 2025-02-06 RX ADMIN — DEXAMETHASONE SODIUM PHOSPHATE 10 MG: 10 INJECTION, SOLUTION INTRAMUSCULAR; INTRAVENOUS at 10:41

## 2025-02-06 RX ADMIN — PHENYLEPHRINE HYDROCHLORIDE 100 MCG: 10 INJECTION INTRAVENOUS at 10:54

## 2025-02-06 RX ADMIN — FENTANYL CITRATE 50 MCG: 50 INJECTION, SOLUTION INTRAMUSCULAR; INTRAVENOUS at 11:31

## 2025-02-06 RX ADMIN — HYDROMORPHONE HYDROCHLORIDE 0.25 MG: 1 INJECTION, SOLUTION INTRAMUSCULAR; INTRAVENOUS; SUBCUTANEOUS at 12:24

## 2025-02-06 ASSESSMENT — PAIN SCALES - GENERAL
PAINLEVEL_OUTOF10: 6
PAINLEVEL_OUTOF10: 6
PAINLEVEL_OUTOF10: 0
PAINLEVEL_OUTOF10: 4
PAINLEVEL_OUTOF10: 4
PAINLEVEL_OUTOF10: 0

## 2025-02-06 ASSESSMENT — PAIN DESCRIPTION - LOCATION
LOCATION: BREAST

## 2025-02-06 ASSESSMENT — PAIN DESCRIPTION - ORIENTATION
ORIENTATION: LEFT

## 2025-02-06 ASSESSMENT — PAIN DESCRIPTION - DESCRIPTORS
DESCRIPTORS: SORE
DESCRIPTORS: DISCOMFORT;SORE
DESCRIPTORS: DISCOMFORT;SORE

## 2025-02-06 ASSESSMENT — PAIN DESCRIPTION - PAIN TYPE
TYPE: SURGICAL PAIN

## 2025-02-06 ASSESSMENT — PAIN DESCRIPTION - FREQUENCY
FREQUENCY: INTERMITTENT
FREQUENCY: INTERMITTENT

## 2025-02-06 ASSESSMENT — PAIN DESCRIPTION - ONSET
ONSET: ON-GOING
ONSET: ON-GOING

## 2025-02-06 ASSESSMENT — PAIN - FUNCTIONAL ASSESSMENT: PAIN_FUNCTIONAL_ASSESSMENT: ADULT NONVERBAL PAIN SCALE (NPVS)

## 2025-02-06 NOTE — DISCHARGE INSTRUCTIONS
Discharge Home.   Call office to schedule a follow-up appointment at [phone]  Please call to schedule an appointment to be seen In our office on Friday 2-14-24  Diet: regular diet  Activity: ambulate in house and no Strenuous exercise for 1 week    Shower/Bathing:  You can shower in 48 hours over the incision site.  At that time you can allow soap and water to rinse off the incision site while showering.  Once you are done in the shower you can pat dry the incision site with a clean paper towel.  No baths, hot tubs or soaking of the wound site at this time.     Dressings /Splint /Wound Care:Keep wound clean and dry.  There is no need to remove your steri strips.  Your surgical bra needs to be worn at all times.  Your bra may become saturated with drainage this is to be expected.  The bra can be off for period of time to have it washed and dried.  You can use gauze padding as needed for comfort under the surgical bra.  This gauze dressing can be changed as needed if the gauze becomes saturated.  At any point during the day if you notice any sudden changes to the appearance of the wound or the site operated on you will need to contact our office.  This may include opening of wound, pus, changes in size, color etc.      Driving: It is OK to drive if the following criteria are met:   1- Not taking narcotic pain medication   2- Not distracted by pain or limited ROM   3- Not a hazard to self or others on the road      Medications: You have been prescribed a narcotic pain medication but your doctor wants you to take Tylenol every 6 hours for the next 4 days as your baseline pain medication.  We do not expect you to be pain-free.  You just had surgery and this is not realistic.  Your pain should be mild to moderate at a level you should be able to handle.  If your pain is too severe, you may take up to 1 tab of your prescribed narcotic every 6 hours, but your doctor hope you do not need the narcotics so you can avoid  any of the side effects.  Do not take your narcotic to help you sleep.  You can take Tylenol PM over-the-counter if you need a sleep aid.  Please record average pain scale each evening on a scale of 1-10.  Please bring the pain record in with you to your first office visit following surgery.  Any questions about pain management please contact our office.    Do not use ice over your breasts.     Educated to contact physician at [phone] with concerns or signs of infection (redness, increasing pain, discharge, odor, fever).

## 2025-02-06 NOTE — ANESTHESIA PRE PROCEDURE
Department of Anesthesiology  Preprocedure Note       Name:  Linda Bustamante   Age:  39 y.o.  :  1985                                          MRN:  [id number]         Date:  2025      Surgeon: Surgeon(s):  Jack Juarez MD    Procedure: Procedure(s):  second stage breast reconstruction with exchange of right tissue expander for silicone implant,matching left mastopexy    Medications prior to admission:   Prior to Admission medications    Medication Sig Start Date End Date Taking? Authorizing Provider   zonisamide (ZONEGRAN) 100 MG capsule 2 capsules at bedtime 23  Yes Julia Amaya MD   Montelukast Sodium (SINGULAIR PO) Take 10 mg by mouth daily as needed   Yes Julia Amaya MD   levothyroxine (SYNTHROID) 112 MCG tablet Take 1 tablet by mouth daily   Yes Julia Amaya MD   cetirizine (ZYRTEC) 10 MG tablet Take 1 tablet by mouth daily   Yes Julia Amaya MD   tamoxifen (NOLVADEX) 20 MG tablet Take 1 tablet by mouth daily 25   Prince Wheeler MD   aspirin 81 MG EC tablet Take 1 tablet by mouth daily 25   Prince Wheeler MD   CHEMOTHERAPY Infuse intravenously every 21 days    Julia Amaya MD   Levonorgestrel (KYLEENA) IUD 19.5 mg 1 each by IntraUTERine route once    Juila Amaya MD   ondansetron (ZOFRAN-ODT) 8 MG TBDP disintegrating tablet Take 1 tablet by mouth every 8 hours as needed for Nausea or Vomiting 24   Prince Wheeler MD   prochlorperazine (COMPAZINE) 10 MG tablet Take 1 tablet by mouth every 6 hours as needed (nausea) 24   Prince Wheeler MD   ondansetron (ZOFRAN) 4 MG tablet Take 1 tablet by mouth daily as needed for Nausea or Vomiting 24   Tone Manuel PA   fluticasone (FLONASE) 50 MCG/ACT nasal spray 1 spray by Nasal route daily 24   Julia Amaya MD       Current medications:    Current Facility-Administered Medications   Medication Dose Route Frequency Provider Last Rate Last Admin    sodium

## 2025-02-06 NOTE — ANESTHESIA POSTPROCEDURE EVALUATION
Department of Anesthesiology  Postprocedure Note    Patient: Linda Bustamante  MRN: [id number]  YOB: 1985  Date of evaluation: 2/6/2025    Procedure Summary       Date: 02/06/25 Room / Location: [address]    Anesthesia Start: 1033 Anesthesia Stop: 1158    Procedure: second stage breast reconstruction with exchange of right tissue expander for silicone implant,matching left mastopexy (Bilateral: Breast) Diagnosis:       Cancer of right breast (HCC)      (Cancer of right breast (HCC) [C50.911])    Surgeons: Jack Juarez MD Responsible Provider: Senait Alcantar MD    Anesthesia Type: General ASA Status: 2            Anesthesia Type: General    Sofia Phase I: Sofia Score: 10    Sofia Phase II: Sofia Score: 10    Anesthesia Post Evaluation    Patient location during evaluation: PACU  Patient participation: complete - patient participated  Level of consciousness: awake and alert  Airway patency: patent  Nausea & Vomiting: no nausea and no vomiting  Cardiovascular status: blood pressure returned to baseline and hemodynamically stable  Respiratory status: acceptable and spontaneous ventilation  Hydration status: euvolemic  Multimodal analgesia pain management approach  Pain management: adequate    No notable events documented.

## 2025-02-06 NOTE — OP NOTE
Operative Note      Patient: Linda Bustamante  YOB: 1985  MRN: [id number]    Date of Procedure: 2/6/2025    Pre-Op Diagnosis Codes:      * Cancer of right breast (HCC) [C50.911]    Post-Op Diagnosis: Same       Procedure(s):  second stage breast reconstruction with exchange of right tissue expander for silicone implant,matching left mastopexy    Surgeon(s):  Jack Juarez MD    Assistant:   Physician Assistant: Tone Manuel PA    Anesthesia: General    Estimated Blood Loss (mL): 25cc    Complications: None    Specimens:   ID Type Source Tests Collected by Time Destination   A : Right Mastectomy scar Tissue Tissue SURGICAL PATHOLOGY Jack Juarez MD 2/6/2025 1107    B : Left Breast skin Tissue Tissue SURGICAL PATHOLOGY Jack Juarez MD 2/6/2025 1134        Implants:  Implant Name Type Inv. Item Serial No.  Lot No. LRB No. Used Action   IMPL BREAST GEL BOOST SMTH MOD + PROF 320CC - [id number]  IMPL BREAST GEL BOOST SMTH MOD + PROF 320CC [id number] [id number] Right 1 Implanted         Drains: * No LDAs found *    Findings:  Infection Present At Time Of Surgery (PATOS) (choose all levels that have infection present):  No infection present    Detailed Description of Procedure:                 ASSISTANT:  Tone Manuel PA-C.  PA was required for the case due  to lack of adequately trained assistant; was involved in prepping,  draping, retracting, dressing and suturing.       SPECIMEN:  Bilateral breast skin and tissue.             INDICATIONS:  The patient is a 39 y.o. female with history of breast cancer.  The patient elected to proceed with replacement of right tissue expander for more permanent silicone implant as well as contralateral matching mastopexy.  Risks, benefits, and alternatives were explained to her including but not limited to bleeding, scarring, infection, DVT, anesthesia related complications, death, and need for further surgery.

## 2025-02-11 LAB — SURGICAL PATHOLOGY REPORT: NORMAL

## 2025-02-13 ENCOUNTER — OFFICE VISIT (OUTPATIENT)
Dept: ONCOLOGY | Age: 40
End: 2025-02-13
Payer: COMMERCIAL

## 2025-02-13 ENCOUNTER — HOSPITAL ENCOUNTER (OUTPATIENT)
Dept: INFUSION THERAPY | Age: 40
Discharge: HOME | End: 2025-02-13
Payer: COMMERCIAL

## 2025-02-13 VITALS
DIASTOLIC BLOOD PRESSURE: 82 MMHG | WEIGHT: 131.7 LBS | TEMPERATURE: 97.3 F | OXYGEN SATURATION: 95 % | HEIGHT: 62 IN | SYSTOLIC BLOOD PRESSURE: 113 MMHG | BODY MASS INDEX: 24.24 KG/M2 | HEART RATE: 80 BPM

## 2025-02-13 DIAGNOSIS — C50.911 INVASIVE DUCTAL CARCINOMA OF BREAST, FEMALE, RIGHT (HCC): ICD-10-CM

## 2025-02-13 DIAGNOSIS — C50.111 MALIGNANT NEOPLASM OF CENTRAL PORTION OF RIGHT BREAST IN FEMALE, ESTROGEN RECEPTOR POSITIVE (HCC): ICD-10-CM

## 2025-02-13 DIAGNOSIS — Z17.0 MALIGNANT NEOPLASM OF CENTRAL PORTION OF RIGHT BREAST IN FEMALE, ESTROGEN RECEPTOR POSITIVE (HCC): ICD-10-CM

## 2025-02-13 LAB
ALBUMIN SERPL-MCNC: 4.4 G/DL (ref 3.5–5.2)
ALP SERPL-CCNC: 88 U/L (ref 35–104)
ALT SERPL-CCNC: 22 U/L (ref 0–32)
ANION GAP SERPL CALCULATED.3IONS-SCNC: 14 MMOL/L (ref 7–16)
AST SERPL-CCNC: 23 U/L (ref 0–31)
BASOPHILS # BLD: 0.03 K/UL (ref 0–0.2)
BASOPHILS NFR BLD: 1 % (ref 0–2)
BILIRUB SERPL-MCNC: 0.2 MG/DL (ref 0–1.2)
BUN SERPL-MCNC: 14 MG/DL (ref 6–20)
CALCIUM SERPL-MCNC: 9.2 MG/DL (ref 8.6–10.2)
CHLORIDE SERPL-SCNC: 107 MMOL/L (ref 98–107)
CO2 SERPL-SCNC: 19 MMOL/L (ref 22–29)
CREAT SERPL-MCNC: 0.7 MG/DL (ref 0.5–1)
EOSINOPHIL # BLD: 0.09 K/UL (ref 0.05–0.5)
EOSINOPHILS RELATIVE PERCENT: 2 % (ref 0–6)
ERYTHROCYTE [DISTWIDTH] IN BLOOD BY AUTOMATED COUNT: 11.9 % (ref 11.5–15)
GFR, ESTIMATED: >90 ML/MIN/1.73M2
GLUCOSE SERPL-MCNC: 84 MG/DL (ref 74–99)
HCT VFR BLD AUTO: 46.8 % (ref 34–48)
HGB BLD-MCNC: 14.9 G/DL (ref 11.5–15.5)
IMM GRANULOCYTES # BLD AUTO: <0.03 K/UL (ref 0–0.58)
IMM GRANULOCYTES NFR BLD: 0 % (ref 0–5)
LYMPHOCYTES NFR BLD: 1.6 K/UL (ref 1.5–4)
LYMPHOCYTES RELATIVE PERCENT: 33 % (ref 20–42)
MCH RBC QN AUTO: 32.1 PG (ref 26–35)
MCHC RBC AUTO-ENTMCNC: 31.8 G/DL (ref 32–34.5)
MCV RBC AUTO: 100.9 FL (ref 80–99.9)
MONOCYTES NFR BLD: 0.19 K/UL (ref 0.1–0.95)
MONOCYTES NFR BLD: 4 % (ref 2–12)
NEUTROPHILS NFR BLD: 61 % (ref 43–80)
NEUTS SEG NFR BLD: 3 K/UL (ref 1.8–7.3)
PLATELET # BLD AUTO: 226 K/UL (ref 130–450)
PMV BLD AUTO: 10.7 FL (ref 7–12)
POTASSIUM SERPL-SCNC: 3.9 MMOL/L (ref 3.5–5)
PROT SERPL-MCNC: 7.4 G/DL (ref 6.4–8.3)
RBC # BLD AUTO: 4.64 M/UL (ref 3.5–5.5)
SODIUM SERPL-SCNC: 140 MMOL/L (ref 132–146)
WBC OTHER # BLD: 4.9 K/UL (ref 4.5–11.5)

## 2025-02-13 PROCEDURE — 85025 COMPLETE CBC W/AUTO DIFF WBC: CPT

## 2025-02-13 PROCEDURE — G8420 CALC BMI NORM PARAMETERS: HCPCS | Performed by: STUDENT IN AN ORGANIZED HEALTH CARE EDUCATION/TRAINING PROGRAM

## 2025-02-13 PROCEDURE — 36415 COLL VENOUS BLD VENIPUNCTURE: CPT

## 2025-02-13 PROCEDURE — 99212 OFFICE O/P EST SF 10 MIN: CPT

## 2025-02-13 PROCEDURE — G8427 DOCREV CUR MEDS BY ELIG CLIN: HCPCS | Performed by: STUDENT IN AN ORGANIZED HEALTH CARE EDUCATION/TRAINING PROGRAM

## 2025-02-13 PROCEDURE — 1036F TOBACCO NON-USER: CPT | Performed by: STUDENT IN AN ORGANIZED HEALTH CARE EDUCATION/TRAINING PROGRAM

## 2025-02-13 PROCEDURE — 80053 COMPREHEN METABOLIC PANEL: CPT

## 2025-02-13 PROCEDURE — 99213 OFFICE O/P EST LOW 20 MIN: CPT | Performed by: STUDENT IN AN ORGANIZED HEALTH CARE EDUCATION/TRAINING PROGRAM

## 2025-02-13 RX ORDER — ASPIRIN 81 MG/1
81 TABLET ORAL DAILY
Qty: 90 TABLET | Refills: 1 | Status: SHIPPED | OUTPATIENT
Start: 2025-02-13

## 2025-02-13 RX ORDER — TAMOXIFEN CITRATE 20 MG/1
20 TABLET ORAL DAILY
Qty: 90 TABLET | Refills: 1 | Status: SHIPPED | OUTPATIENT
Start: 2025-02-13

## 2025-02-13 NOTE — PROGRESS NOTES
Patient did stop at check out window, ok'd to leave without AVS.  Patient has MYCHART.      
   Smoking status: Former     Current packs/day: 0.00     Types: Cigarettes     Quit date: 2015     Years since quittin.6    Smokeless tobacco: Never   Vaping Use    Vaping status: Never Used   Substance and Sexual Activity    Alcohol use: Yes     Comment: socially only    Drug use: No    Sexual activity: Yes   Other Topics Concern    Not on file   Social History Narrative    Not on file     Social Determinants of Health     Financial Resource Strain: Not on file   Food Insecurity: Not on file   Transportation Needs: Not on file   Physical Activity: Not on file   Stress: Not on file   Social Connections: Not on file   Intimate Partner Violence: Not on file   Housing Stability: Not on file       Family History   Problem Relation Age of Onset    Hypertension Mother     Heart Failure Father     Cancer Maternal Aunt         cervical-unknown    Breast Cancer Maternal Aunt         age of  diagnosis    Breast Cancer Paternal Aunt         unknown    Colon Cancer Maternal Grandmother 88           Current Outpatient Medications on File Prior to Visit   Medication Sig Dispense Refill    HYDROcodone-acetaminophen (NORCO) 5-325 MG per tablet Take 1 tablet by mouth every 4 hours as needed for Pain for up to 7 days. Intended supply: 5 days. Take lowest dose possible to manage pain Max Daily Amount: 6 tablets 20 tablet 0    ondansetron (ZOFRAN) 4 MG tablet Take 1 tablet by mouth daily as needed for Nausea or Vomiting 12 tablet 1    Levonorgestrel (KYLEENA) IUD 19.5 mg 1 each by IntraUTERine route once      ondansetron (ZOFRAN-ODT) 8 MG TBDP disintegrating tablet Take 1 tablet by mouth every 8 hours as needed for Nausea or Vomiting 28 tablet 1    prochlorperazine (COMPAZINE) 10 MG tablet Take 1 tablet by mouth every 6 hours as needed (nausea) 30 tablet 0    ondansetron (ZOFRAN) 4 MG tablet Take 1 tablet by mouth daily as needed for Nausea or Vomiting 12 tablet 1    fluticasone (FLONASE) 50 MCG/ACT nasal spray 1 spray by

## 2025-02-13 NOTE — PROGRESS NOTES
or soaking of the wound site at this time.  Pt voices understanding.       F/U in 2 weeks.    Call office with concerns or signs of infection.        This document is generated, in part, by voice recognition software and thus  syntax and grammatical errors are possible.    Jack Juarez MD  1:13 PM  2/14/2025

## 2025-02-14 ENCOUNTER — OFFICE VISIT (OUTPATIENT)
Dept: SURGERY | Age: 40
End: 2025-02-14

## 2025-02-14 VITALS — HEART RATE: 89 BPM | OXYGEN SATURATION: 99 % | TEMPERATURE: 98.4 F

## 2025-02-14 DIAGNOSIS — C50.911 INVASIVE DUCTAL CARCINOMA OF BREAST, FEMALE, RIGHT (HCC): Primary | ICD-10-CM

## 2025-02-14 DIAGNOSIS — Z98.890 S/P BREAST RECONSTRUCTION: ICD-10-CM

## 2025-02-14 DIAGNOSIS — Z12.31 VISIT FOR SCREENING MAMMOGRAM: Primary | ICD-10-CM

## 2025-02-14 NOTE — PROGRESS NOTES
02/06/2025 she underwent second stage breast reconstruction with exchange of right tissue expander for silicone implant,matching left mastopexy.      She will be due for a left screening mammogram and will plan for left screening mammogram in Mid/Late June ( 1 year after her breast MRI) to allow healing from recent left mastopexy.    Abby Wakefield RN (Terrie), MSN, APRN-CNP, AOCNP  Advanced Oncology Certified Nurse Practitioner  [facility]-Department of Breast Surgery   Office Phone [phone]; Fax [phone]  Located within the [facility] Breast [facility]

## 2025-02-28 ENCOUNTER — OFFICE VISIT (OUTPATIENT)
Dept: SURGERY | Age: 40
End: 2025-02-28

## 2025-02-28 VITALS
HEART RATE: 72 BPM | RESPIRATION RATE: 18 BRPM | SYSTOLIC BLOOD PRESSURE: 116 MMHG | OXYGEN SATURATION: 99 % | DIASTOLIC BLOOD PRESSURE: 76 MMHG | TEMPERATURE: 97.2 F

## 2025-02-28 DIAGNOSIS — C50.911 INVASIVE DUCTAL CARCINOMA OF BREAST, FEMALE, RIGHT (HCC): Primary | ICD-10-CM

## 2025-02-28 PROCEDURE — 99024 POSTOP FOLLOW-UP VISIT: CPT | Performed by: PLASTIC SURGERY

## 2025-03-04 NOTE — PROGRESS NOTES
Subjective:    Follow up today from second stage breast reconstruction with exchange of right tissue expander for silicone implant,matching left mastopexy - Bilateral 2/6/2025 . Denies fever, nausea, vomiting, leg pain or swelling, pain is mild.  The pt states continues to wear her surgical bra.    She is ambulating at home.    Objective:    /76 (Site: Left Upper Arm, Position: Sitting, Cuff Size: Medium Adult)   Pulse 72   Temp 97.2 °F (36.2 °C) (Infrared)   Resp 18   LMP 08/28/2024   SpO2 99%       Left Breast- Clean, dry, and intact, no drainage. Steri strips intact, no signs of infection.      Right Breast Clean, dry, and intact, no drainage. Steri strips intact, no signs of infection.         Assessment:    Patient Active Problem List   Diagnosis    Invasive ductal carcinoma of breast, female, right (HCC)    Malignant neoplasm of right breast (HCC)    Pre-operative laboratory examination    Cancer of right breast (HCC)     Path Number: [id number]     -- Diagnosis --   A.  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  Left breast skin, excision: Benign skin with dermal fibrosis       Maura Martinez MD   **Electronically Signed Out**         csm/2/11/2025     Plan:     Status Post : second stage breast reconstruction with exchange of right tissue expander for silicone implant,matching left mastopexy - Bilateral 2/6/2025    Bra of her choice, analgesia with Tylenol as needed.  Patient may increase activity to ad chaya.      F/U in 6-8 weeks.    Call office with concerns or signs of infection.        This document is generated, in part, by voice recognition software and thus  syntax and grammatical errors are possible.    Jack Juarez MD  11:55 AM  3/4/2025

## 2025-03-08 PROBLEM — Z01.812 PRE-OPERATIVE LABORATORY EXAMINATION: Status: RESOLVED | Noted: 2024-08-13 | Resolved: 2025-03-08

## 2025-04-09 ENCOUNTER — HOSPITAL ENCOUNTER (OUTPATIENT)
Dept: OCCUPATIONAL THERAPY | Age: 40
Setting detail: THERAPIES SERIES
Discharge: HOME | End: 2025-04-09
Payer: COMMERCIAL

## 2025-04-09 PROCEDURE — 97535 SELF CARE MNGMENT TRAINING: CPT | Performed by: OCCUPATIONAL THERAPIST

## 2025-04-09 NOTE — DISCHARGE SUMMARY
Occupational Therapy  OCCUPATIONAL THERAPY UPDATE/PROGRESS NOTE  [place] [address] [facility] [address]              Phone: [phone]             Fax: [phone]    Date:  2025  Initial Evaluation Date: 2024     Evaluating Therapist: SOLA Moctezuma/L, CLT-REBECCA    Patient Name:  Linda Bustamante    :  1985    Restrictions/Precautions:   fall risk  Diagnosis:  Invasive ductal carcinoma of breast, female, right (C50.911)           Date of Surgery/Injury: n/a    Insurance/Certification information:  [facility] [id number]   Plan of care signed (Y/N): N  Visit#:  8   Total Visits to date:  Evaluation +Visit #8   Current update duration from 2024 to 2025  Cancels/No-shows to date: 1  Last seen by therapist:  2024  Patient reaction to treatment:  patient has made steady gains toward goals.  Patient currently performing range of motion / stretch exercises as able / needed.  Patient stated she continues to have minimal to trace tightness upper axilla and upper medial triceps/biceps area at axilla.  Patient further performing surveillance for best practice prevention of lymphedema.  Patient maintaining low sodium eating lifestyle.  Patient also utilizes her compression bra days or work and when performing exercise and increased work at home.  Patient has completed reconstruction surgery without issue.    Garment / DME recommended:  garment replacement as appropriate    Referring Practitioner:  Prince Wheeler MD    NPI: [id number]   Specific Practitioner Orders: evaluation and treatment    Assessment of current deficits   []Pain  []Skin Integrity   [x]Lymphedema   []Functional transfers/mobility   []ADLs   []Strength    []Cognition  []IADLs   []Safety Awareness   []  Motor Endurance    []Fine Motor Coordination   []Balance   []Vision/perception  []Sensation []Gross Motor Coordination  [x]ROM     OT PLAN OF CARE   OT POC based on

## 2025-04-17 ENCOUNTER — OFFICE VISIT (OUTPATIENT)
Dept: ONCOLOGY | Age: 40
End: 2025-04-17
Payer: COMMERCIAL

## 2025-04-17 ENCOUNTER — HOSPITAL ENCOUNTER (OUTPATIENT)
Dept: INFUSION THERAPY | Age: 40
Discharge: HOME | End: 2025-04-17
Payer: COMMERCIAL

## 2025-04-17 VITALS
DIASTOLIC BLOOD PRESSURE: 73 MMHG | WEIGHT: 132.7 LBS | HEIGHT: 62 IN | SYSTOLIC BLOOD PRESSURE: 115 MMHG | HEART RATE: 73 BPM | BODY MASS INDEX: 24.42 KG/M2 | TEMPERATURE: 97.7 F | OXYGEN SATURATION: 100 %

## 2025-04-17 DIAGNOSIS — R42 DIZZINESS: ICD-10-CM

## 2025-04-17 DIAGNOSIS — Z17.0 MALIGNANT NEOPLASM OF CENTRAL PORTION OF RIGHT BREAST IN FEMALE, ESTROGEN RECEPTOR POSITIVE (HCC): ICD-10-CM

## 2025-04-17 DIAGNOSIS — R55 SYNCOPE, UNSPECIFIED SYNCOPE TYPE: ICD-10-CM

## 2025-04-17 DIAGNOSIS — C50.911 INVASIVE DUCTAL CARCINOMA OF BREAST, FEMALE, RIGHT: Primary | ICD-10-CM

## 2025-04-17 DIAGNOSIS — C50.111 MALIGNANT NEOPLASM OF CENTRAL PORTION OF RIGHT BREAST IN FEMALE, ESTROGEN RECEPTOR POSITIVE (HCC): ICD-10-CM

## 2025-04-17 LAB
ALBUMIN SERPL-MCNC: 4.3 G/DL (ref 3.5–5.2)
ALP SERPL-CCNC: 81 U/L (ref 35–104)
ALT SERPL-CCNC: 12 U/L (ref 0–35)
ANION GAP SERPL CALCULATED.3IONS-SCNC: 10 MMOL/L (ref 7–16)
AST SERPL-CCNC: 25 U/L (ref 0–35)
BASOPHILS # BLD: 0.03 K/UL (ref 0–0.2)
BASOPHILS NFR BLD: 1 % (ref 0–2)
BILIRUB SERPL-MCNC: <0.2 MG/DL (ref 0–1.2)
BUN SERPL-MCNC: 13 MG/DL (ref 6–20)
CALCIUM SERPL-MCNC: 9.4 MG/DL (ref 8.6–10)
CHLORIDE SERPL-SCNC: 107 MMOL/L (ref 98–107)
CO2 SERPL-SCNC: 25 MMOL/L (ref 22–29)
CREAT SERPL-MCNC: 0.8 MG/DL (ref 0.5–1)
EOSINOPHIL # BLD: 0.1 K/UL (ref 0.05–0.5)
EOSINOPHILS RELATIVE PERCENT: 2 % (ref 0–6)
ERYTHROCYTE [DISTWIDTH] IN BLOOD BY AUTOMATED COUNT: 12.4 % (ref 11.5–15)
GFR, ESTIMATED: >90 ML/MIN/1.73M2
GLUCOSE SERPL-MCNC: 89 MG/DL (ref 74–99)
HCT VFR BLD AUTO: 41.8 % (ref 34–48)
HGB BLD-MCNC: 14 G/DL (ref 11.5–15.5)
IMM GRANULOCYTES # BLD AUTO: <0.03 K/UL (ref 0–0.58)
IMM GRANULOCYTES NFR BLD: 0 % (ref 0–5)
LYMPHOCYTES NFR BLD: 1.53 K/UL (ref 1.5–4)
LYMPHOCYTES RELATIVE PERCENT: 30 % (ref 20–42)
MCH RBC QN AUTO: 31.7 PG (ref 26–35)
MCHC RBC AUTO-ENTMCNC: 33.5 G/DL (ref 32–34.5)
MCV RBC AUTO: 94.6 FL (ref 80–99.9)
MONOCYTES NFR BLD: 0.33 K/UL (ref 0.1–0.95)
MONOCYTES NFR BLD: 7 % (ref 2–12)
NEUTROPHILS NFR BLD: 61 % (ref 43–80)
NEUTS SEG NFR BLD: 3.08 K/UL (ref 1.8–7.3)
PLATELET # BLD AUTO: 222 K/UL (ref 130–450)
PMV BLD AUTO: 11.1 FL (ref 7–12)
POTASSIUM SERPL-SCNC: 3.9 MMOL/L (ref 3.5–5.1)
PROT SERPL-MCNC: 7 G/DL (ref 6.4–8.3)
RBC # BLD AUTO: 4.42 M/UL (ref 3.5–5.5)
SODIUM SERPL-SCNC: 143 MMOL/L (ref 136–145)
WBC OTHER # BLD: 5.1 K/UL (ref 4.5–11.5)

## 2025-04-17 PROCEDURE — 99213 OFFICE O/P EST LOW 20 MIN: CPT

## 2025-04-17 PROCEDURE — 1036F TOBACCO NON-USER: CPT | Performed by: STUDENT IN AN ORGANIZED HEALTH CARE EDUCATION/TRAINING PROGRAM

## 2025-04-17 PROCEDURE — 85025 COMPLETE CBC W/AUTO DIFF WBC: CPT

## 2025-04-17 PROCEDURE — 36415 COLL VENOUS BLD VENIPUNCTURE: CPT

## 2025-04-17 PROCEDURE — 99213 OFFICE O/P EST LOW 20 MIN: CPT | Performed by: STUDENT IN AN ORGANIZED HEALTH CARE EDUCATION/TRAINING PROGRAM

## 2025-04-17 PROCEDURE — G8420 CALC BMI NORM PARAMETERS: HCPCS | Performed by: STUDENT IN AN ORGANIZED HEALTH CARE EDUCATION/TRAINING PROGRAM

## 2025-04-17 PROCEDURE — G8427 DOCREV CUR MEDS BY ELIG CLIN: HCPCS | Performed by: STUDENT IN AN ORGANIZED HEALTH CARE EDUCATION/TRAINING PROGRAM

## 2025-04-17 PROCEDURE — 80053 COMPREHEN METABOLIC PANEL: CPT

## 2025-04-17 RX ORDER — COPPER 313.4 MG/1
1 INTRAUTERINE DEVICE INTRAUTERINE ONCE
COMMUNITY

## 2025-04-17 NOTE — PROGRESS NOTES
Patient provided with discharge instructions, patient has MYCHART.  All questions answered.  Patient understands follow up plan of care.      No rtc disposition note / no provider progress note.      Patient previously scheduled for 05/15/25 for her 3 month follow up appointment.  Patient aware of May 2025 visit.    
murmurs, rubs or gallops; no carotid bruits.   ABDOMEN:  Non distended.    No ascites.     EXTREMITIES: without clubbing, cyanosis, or edema.  NEUROLOGIC:  Alert, awake, oriented to time, place and person. No focal deficits.  SKIN : No Rash. No Bruising.  PSYCH: Normal mood, behavior, affect, judgement, and thought content.    ECOG PS 0      No results found.         ASSESSMENT      Invasive ductal carcinoma of the right breast, ER/MA 90%, HER2 negative (IHC +), Stage IA (pT2 N0), Oncotype 21  Right mastectomies on 8/13/24  Nicholas genetic testing negative  S/p adjuvant TC x4 on 12/5/24  Started tamoxifen in Jan 2025      PLAN       02/13/2025:   Tolerated first month of tamoxifen without major issues  Followed up with her OB/GYN, in which her Kyleena IUD will be replaced by a ParaGard IUD, which is nonhormonal  We we will 9-day supply of tamoxifen and aspirin  Scheduled to follow-up with breast surgery team on 2/25/2025  Following up with plastic surgery for reconstruction    4/17/25:   Pt comes in early due to acute complaint  C/o dizziness the last few weeks  Also had a fainting episode  Examine overall unremarkable  Pt again c/o prominence of her left temple  Will order brain MRI  Advised taking a break from tamoxifen (as well as aspirin); pt is unsure if medication is causing her symptoms      DISPO:   Regular follow-up already scheduled on 5/15/2025      Thank you for allowing us to participate in the care of Linda Bustamante       Approximately spent 28 minutes on chart review as well as time spent on patient encounter, discussing the laboratory, imaging, and clinical findings; and documentation. I have discussed clinical implications and recommendations on the patient's primary issues. More than 50% of time was spent counseling patient. The patient verbalized understanding.      Prince Wheeler MD  Medical Oncology  [facility]  04/17/2025    [place] ([place]) Office  P: [phone]  F:

## 2025-04-22 ENCOUNTER — TELEPHONE (OUTPATIENT)
Dept: SURGERY | Age: 40
End: 2025-04-22

## 2025-04-22 NOTE — TELEPHONE ENCOUNTER
Patient called left breast nipple had a black/hard dot, she stated it feels like something is poking through area, follow up is not until 5/16, please advise.

## 2025-04-24 NOTE — PROGRESS NOTES
from her second stage reconstruction        Follow-up day of procedure.    KATALINA Jennings

## 2025-04-25 ENCOUNTER — OFFICE VISIT (OUTPATIENT)
Dept: SURGERY | Age: 40
End: 2025-04-25

## 2025-04-25 VITALS
TEMPERATURE: 97.6 F | DIASTOLIC BLOOD PRESSURE: 72 MMHG | RESPIRATION RATE: 18 BRPM | OXYGEN SATURATION: 99 % | SYSTOLIC BLOOD PRESSURE: 100 MMHG | HEART RATE: 71 BPM

## 2025-04-25 DIAGNOSIS — Z98.890 S/P BREAST RECONSTRUCTION: ICD-10-CM

## 2025-04-25 DIAGNOSIS — C50.911 INVASIVE DUCTAL CARCINOMA OF BREAST, FEMALE, RIGHT (HCC): Primary | ICD-10-CM

## 2025-05-08 ENCOUNTER — APPOINTMENT (OUTPATIENT)
Dept: ULTRASOUND IMAGING | Age: 40
End: 2025-05-08
Payer: COMMERCIAL

## 2025-05-08 ENCOUNTER — HOSPITAL ENCOUNTER (EMERGENCY)
Age: 40
Discharge: LEFT BEFORE DISCHARGE | End: 2025-05-08
Attending: STUDENT IN AN ORGANIZED HEALTH CARE EDUCATION/TRAINING PROGRAM
Payer: COMMERCIAL

## 2025-05-08 VITALS
BODY MASS INDEX: 24.14 KG/M2 | TEMPERATURE: 97.7 F | WEIGHT: 132 LBS | DIASTOLIC BLOOD PRESSURE: 101 MMHG | OXYGEN SATURATION: 99 % | SYSTOLIC BLOOD PRESSURE: 147 MMHG | HEART RATE: 87 BPM | RESPIRATION RATE: 19 BRPM

## 2025-05-08 DIAGNOSIS — M79.605 LEFT LEG PAIN: Primary | ICD-10-CM

## 2025-05-08 PROCEDURE — 93971 EXTREMITY STUDY: CPT

## 2025-05-08 PROCEDURE — 99284 EMERGENCY DEPT VISIT MOD MDM: CPT

## 2025-05-08 NOTE — ED PROVIDER NOTES
[facility] EMERGENCY DEPARTMENT  EMERGENCY DEPARTMENT ENCOUNTER        Pt Name: Linda Bustamante  MRN: [id number]  Birthdate 1985  Date of evaluation: 5/8/2025  Provider: Ruba Cam MD  PCP: Yolis Chandler MD  Note Started: 5:38 PM EDT 5/8/25    CHIEF COMPLAINT       Chief Complaint   Patient presents with    Leg Pain     Left leg pain that started 4 weeks ago. Pts oncologist sent her in to rule out a blood clot.        HISTORY OF PRESENT ILLNESS: 1 or more Elements   History From: Patient    Limitations to history : None    Linda Bustamante is a 39 y.o. female with a history of breast cancer status post breast reconstruction currently on tamoxifen who presents for left calf pain over the past month.  She was sent by her oncologist for concerns of possible DVT.  She denies associated chest pain, shortness of breath, redness, joint pains.    Nursing Notes were all reviewed and agreed with or any disagreements were addressed in the HPI.        REVIEW OF EXTERNAL NOTE :       Patient was seen in office by plastic surgery on 4/25/2025 for invasive ductal carcinoma of breast, status post breast reconstruction    REVIEW OF SYSTEMS :           Positives and Pertinent negatives as per HPI.     SURGICAL HISTORY     Past Surgical History:   Procedure Laterality Date    BREAST ENHANCEMENT SURGERY Right 8/13/2024    RIGHT BREAST IMPLANT/TISSUE EXPANDER INSERTION with expander, and alloderm   stage 1 performed by Jack Juarez MD at [facility] [place]    BREAST RECONSTRUCTION Right 8/13/2024    fIRST STAGE RIGHT BREAST RECONSTRUCTION USING TISSUE EXPANDER AND ALLODERM performed by Jack Juarez MD at [facility]    BREAST RECONSTRUCTION Bilateral 2/6/2025    second stage breast reconstruction with exchange of right tissue expander for silicone implant,matching left mastopexy performed by Jack Juarez MD at [facility] [place]    MARIO NEEDLE BIOPSY LYMPH NODE SUPERFICIAL  6/6/2024

## 2025-05-09 ENCOUNTER — HOSPITAL ENCOUNTER (OUTPATIENT)
Dept: MRI IMAGING | Age: 40
Discharge: HOME | End: 2025-05-11
Payer: COMMERCIAL

## 2025-05-09 DIAGNOSIS — R55 SYNCOPE, UNSPECIFIED SYNCOPE TYPE: ICD-10-CM

## 2025-05-09 DIAGNOSIS — R42 DIZZINESS: ICD-10-CM

## 2025-05-09 DIAGNOSIS — C50.911 INVASIVE DUCTAL CARCINOMA OF BREAST, FEMALE, RIGHT (HCC): ICD-10-CM

## 2025-05-09 PROCEDURE — A9577 INJ MULTIHANCE: HCPCS | Performed by: RADIOLOGY

## 2025-05-09 PROCEDURE — 6360000004 HC RX CONTRAST MEDICATION: Performed by: RADIOLOGY

## 2025-05-09 PROCEDURE — 70553 MRI BRAIN STEM W/O & W/DYE: CPT

## 2025-05-09 RX ADMIN — GADOBENATE DIMEGLUMINE 12 ML: 529 INJECTION, SOLUTION INTRAVENOUS at 15:09

## 2025-05-15 ENCOUNTER — OFFICE VISIT (OUTPATIENT)
Age: 40
End: 2025-05-15
Payer: COMMERCIAL

## 2025-05-15 ENCOUNTER — HOSPITAL ENCOUNTER (OUTPATIENT)
Dept: INFUSION THERAPY | Age: 40
Discharge: HOME | End: 2025-05-15
Payer: COMMERCIAL

## 2025-05-15 VITALS
WEIGHT: 129.4 LBS | OXYGEN SATURATION: 100 % | SYSTOLIC BLOOD PRESSURE: 113 MMHG | HEART RATE: 77 BPM | BODY MASS INDEX: 23.81 KG/M2 | HEIGHT: 62 IN | TEMPERATURE: 97.4 F | DIASTOLIC BLOOD PRESSURE: 78 MMHG

## 2025-05-15 DIAGNOSIS — C50.111 MALIGNANT NEOPLASM OF CENTRAL PORTION OF RIGHT BREAST IN FEMALE, ESTROGEN RECEPTOR POSITIVE (HCC): ICD-10-CM

## 2025-05-15 DIAGNOSIS — R90.89 MRI OF BRAIN ABNORMAL: ICD-10-CM

## 2025-05-15 DIAGNOSIS — Z17.0 MALIGNANT NEOPLASM OF CENTRAL PORTION OF RIGHT BREAST IN FEMALE, ESTROGEN RECEPTOR POSITIVE (HCC): ICD-10-CM

## 2025-05-15 DIAGNOSIS — Q28.3 DEVELOPMENTAL VENOUS ANOMALY, CEREBRAL: ICD-10-CM

## 2025-05-15 DIAGNOSIS — R42 DIZZINESS: ICD-10-CM

## 2025-05-15 DIAGNOSIS — M79.605 PAIN OF LEFT LOWER EXTREMITY: ICD-10-CM

## 2025-05-15 DIAGNOSIS — C50.911 INVASIVE DUCTAL CARCINOMA OF BREAST, FEMALE, RIGHT (HCC): Primary | ICD-10-CM

## 2025-05-15 LAB
ALBUMIN SERPL-MCNC: 4.2 G/DL (ref 3.5–5.2)
ALP SERPL-CCNC: 91 U/L (ref 35–104)
ALT SERPL-CCNC: 11 U/L (ref 0–35)
ANION GAP SERPL CALCULATED.3IONS-SCNC: 12 MMOL/L (ref 7–16)
AST SERPL-CCNC: 45 U/L (ref 0–35)
BASOPHILS # BLD: 0.04 K/UL (ref 0–0.2)
BASOPHILS NFR BLD: 1 % (ref 0–2)
BILIRUB SERPL-MCNC: 0.3 MG/DL (ref 0–1.2)
BUN SERPL-MCNC: 16 MG/DL (ref 6–20)
CALCIUM SERPL-MCNC: 9.2 MG/DL (ref 8.6–10)
CHLORIDE SERPL-SCNC: 106 MMOL/L (ref 98–107)
CO2 SERPL-SCNC: 19 MMOL/L (ref 22–29)
CREAT SERPL-MCNC: 0.8 MG/DL (ref 0.5–1)
EOSINOPHIL # BLD: 0.08 K/UL (ref 0.05–0.5)
EOSINOPHILS RELATIVE PERCENT: 2 % (ref 0–6)
ERYTHROCYTE [DISTWIDTH] IN BLOOD BY AUTOMATED COUNT: 12.8 % (ref 11.5–15)
GFR, ESTIMATED: >90 ML/MIN/1.73M2
GLUCOSE SERPL-MCNC: 95 MG/DL (ref 74–99)
HCT VFR BLD AUTO: 41.2 % (ref 34–48)
HGB BLD-MCNC: 13.9 G/DL (ref 11.5–15.5)
IMM GRANULOCYTES # BLD AUTO: <0.03 K/UL (ref 0–0.58)
IMM GRANULOCYTES NFR BLD: 0 % (ref 0–5)
LYMPHOCYTES NFR BLD: 1.44 K/UL (ref 1.5–4)
LYMPHOCYTES RELATIVE PERCENT: 29 % (ref 20–42)
MCH RBC QN AUTO: 31.8 PG (ref 26–35)
MCHC RBC AUTO-ENTMCNC: 33.7 G/DL (ref 32–34.5)
MCV RBC AUTO: 94.3 FL (ref 80–99.9)
MONOCYTES NFR BLD: 0.25 K/UL (ref 0.1–0.95)
MONOCYTES NFR BLD: 5 % (ref 2–12)
NEUTROPHILS NFR BLD: 63 % (ref 43–80)
NEUTS SEG NFR BLD: 3.08 K/UL (ref 1.8–7.3)
PLATELET # BLD AUTO: 221 K/UL (ref 130–450)
PMV BLD AUTO: 11.2 FL (ref 7–12)
POTASSIUM SERPL-SCNC: 4.8 MMOL/L (ref 3.5–5.1)
PROT SERPL-MCNC: 7.4 G/DL (ref 6.4–8.3)
RBC # BLD AUTO: 4.37 M/UL (ref 3.5–5.5)
SODIUM SERPL-SCNC: 138 MMOL/L (ref 136–145)
WBC OTHER # BLD: 4.9 K/UL (ref 4.5–11.5)

## 2025-05-15 PROCEDURE — 85025 COMPLETE CBC W/AUTO DIFF WBC: CPT

## 2025-05-15 PROCEDURE — 99212 OFFICE O/P EST SF 10 MIN: CPT | Performed by: STUDENT IN AN ORGANIZED HEALTH CARE EDUCATION/TRAINING PROGRAM

## 2025-05-15 PROCEDURE — 1036F TOBACCO NON-USER: CPT | Performed by: STUDENT IN AN ORGANIZED HEALTH CARE EDUCATION/TRAINING PROGRAM

## 2025-05-15 PROCEDURE — 36415 COLL VENOUS BLD VENIPUNCTURE: CPT

## 2025-05-15 PROCEDURE — G8427 DOCREV CUR MEDS BY ELIG CLIN: HCPCS | Performed by: STUDENT IN AN ORGANIZED HEALTH CARE EDUCATION/TRAINING PROGRAM

## 2025-05-15 PROCEDURE — 99213 OFFICE O/P EST LOW 20 MIN: CPT | Performed by: STUDENT IN AN ORGANIZED HEALTH CARE EDUCATION/TRAINING PROGRAM

## 2025-05-15 PROCEDURE — G8420 CALC BMI NORM PARAMETERS: HCPCS | Performed by: STUDENT IN AN ORGANIZED HEALTH CARE EDUCATION/TRAINING PROGRAM

## 2025-05-15 PROCEDURE — 80053 COMPREHEN METABOLIC PANEL: CPT

## 2025-05-15 NOTE — PROGRESS NOTES
[facility] MED ONCOLOGY  [address] [address]  Dept: [phone]  Loc: [phone]    Moon Lee MD   ·   MD Georgiana Mccollum APRN  ·  BELLA Torres        Hematology/Oncology   Clinic Progress Note              Patient: Linda Bustamante,  39 y.o. female    Date of Encounter: 05/15/2025     Referring Provider:  Samra Hill MD    Reason for Visit:   RIGHT breast cancer, ER/TX+, HER2-, early stage        PCP:  Yolis Chandler MD      Chief Complaint   Patient presents with    Breast Cancer    Follow-up         Subjective:  Patient complaining dizziness the last couple of weeks.  She also noted to have a fainting episode.  Denies of headaches or vision changes.       HPI from Initial Outpatient Consultation  (09/12/2024):  The patient is a 39 y.o. female who comes in for recent diagnosis of right sided breast cancer, having completed bilateral mastectomies last month. She is doing well after surgery and is accompanied by her .     She denies of significant pain, fevers, chills or bleeding.   History of her breast cancer is outlined below, but in short, the patient had felt a palpable mass, prompting mammogram done through [facility] radiology.   She completed biopsy in June of this year, confirming her diagnosis of ER/TX positive, Her2 negative breast, and LN was negative for malignancy.     She then proceeded with [right] mastectomies as well as insertion of expanders.     Today, we discussed next steps in management.     ONCOLOGIC HISTORY:    Diagnosis/Biopsy - 6/6/24          -- Diagnosis --  A.  Right breast, 12 o'clock, core needle biopsy:  Invasive ductal carcinoma, moderately differentiated (see breast  cancer marker studies and     comment).  Focal benign fibroadenoma.  B.  Right axilla, core needle biopsy:  Benign lymph node and adipose tissue segments.      Breast Cancer Marker Studies:Estrogen

## 2025-05-23 ENCOUNTER — HOSPITAL ENCOUNTER (OUTPATIENT)
Dept: NUCLEAR MEDICINE | Age: 40
Discharge: HOME | End: 2025-05-23
Payer: COMMERCIAL

## 2025-05-23 DIAGNOSIS — M79.605 PAIN OF LEFT LOWER EXTREMITY: ICD-10-CM

## 2025-05-23 DIAGNOSIS — C50.911 INVASIVE DUCTAL CARCINOMA OF BREAST, FEMALE, RIGHT (HCC): ICD-10-CM

## 2025-05-23 PROCEDURE — 3430000000 HC RX DIAGNOSTIC RADIOPHARMACEUTICAL: Performed by: RADIOLOGY

## 2025-05-23 PROCEDURE — 78306 BONE IMAGING WHOLE BODY: CPT | Performed by: STUDENT IN AN ORGANIZED HEALTH CARE EDUCATION/TRAINING PROGRAM

## 2025-05-23 PROCEDURE — A9503 TC99M MEDRONATE: HCPCS | Performed by: RADIOLOGY

## 2025-05-23 RX ORDER — TC 99M MEDRONATE 20 MG/10ML
25 INJECTION, POWDER, LYOPHILIZED, FOR SOLUTION INTRAVENOUS
Status: COMPLETED | OUTPATIENT
Start: 2025-05-23 | End: 2025-05-23

## 2025-05-23 RX ADMIN — TC 99M MEDRONATE 25 MILLICURIE: 20 INJECTION, POWDER, LYOPHILIZED, FOR SOLUTION INTRAVENOUS at 08:35

## 2025-05-27 ENCOUNTER — OFFICE VISIT (OUTPATIENT)
Age: 40
End: 2025-05-27
Payer: COMMERCIAL

## 2025-05-27 ENCOUNTER — RESULTS FOLLOW-UP (OUTPATIENT)
Dept: INFUSION THERAPY | Age: 40
End: 2025-05-27

## 2025-05-27 VITALS
DIASTOLIC BLOOD PRESSURE: 76 MMHG | HEIGHT: 62 IN | OXYGEN SATURATION: 97 % | BODY MASS INDEX: 23.37 KG/M2 | WEIGHT: 127 LBS | SYSTOLIC BLOOD PRESSURE: 107 MMHG | RESPIRATION RATE: 16 BRPM | HEART RATE: 79 BPM

## 2025-05-27 DIAGNOSIS — G44.89 OTHER HEADACHE SYNDROME: Primary | ICD-10-CM

## 2025-05-27 PROCEDURE — G8420 CALC BMI NORM PARAMETERS: HCPCS | Performed by: NEUROLOGICAL SURGERY

## 2025-05-27 PROCEDURE — 99203 OFFICE O/P NEW LOW 30 MIN: CPT | Performed by: NEUROLOGICAL SURGERY

## 2025-05-27 PROCEDURE — 99204 OFFICE O/P NEW MOD 45 MIN: CPT | Performed by: NEUROLOGICAL SURGERY

## 2025-05-27 PROCEDURE — 1036F TOBACCO NON-USER: CPT | Performed by: NEUROLOGICAL SURGERY

## 2025-05-27 PROCEDURE — G8427 DOCREV CUR MEDS BY ELIG CLIN: HCPCS | Performed by: NEUROLOGICAL SURGERY

## 2025-05-27 ASSESSMENT — ENCOUNTER SYMPTOMS
PHOTOPHOBIA: 0
SHORTNESS OF BREATH: 0
ABDOMINAL PAIN: 0
TROUBLE SWALLOWING: 0

## 2025-05-27 NOTE — PROGRESS NOTES
[facility]      Subjective:  HPI         Patient is a 39-year-old female with history of breast cancer, status postchemotherapy.  Patient underwent MRI scan of brain and she presents for neurosurgical evaluation.  Patient denies any double vision, blurry vision, any weakness.  Patient sensory examination is normal.  Objectively patient cranial nerves II to XII are intact.        Review of Systems   Constitutional:  Negative for fever.   HENT:  Negative for trouble swallowing.    Eyes:  Negative for photophobia.   Respiratory:  Negative for shortness of breath.    Cardiovascular:  Negative for chest pain.   Gastrointestinal:  Negative for abdominal pain.   Endocrine: Negative for heat intolerance.   Genitourinary:  Negative for flank pain.   Musculoskeletal:  Negative for myalgias.   Skin:  Negative for wound.   Neurological:  Negative for headaches.   Psychiatric/Behavioral:  Negative for confusion.         Objective:  Vitals:    05/27/25 1119   BP: 107/76   Pulse: 79   Resp: 16   SpO2: 97%       Physical Exam  HENT:      Head: Normocephalic.   Eyes:      Pupils: Pupils are equal, round, and reactive to light.   Cardiovascular:      Rate and Rhythm: Normal rate.   Pulmonary:      Effort: Pulmonary effort is normal.   Abdominal:      General: There is no distension.   Skin:     General: Skin is warm and dry.   Neurological:      Mental Status: She is alert.      Comments: A&Ox3  CN3-12 intact  Motor Strength full   Sensation intact to light touch   Reflexes normal    Psychiatric:         Thought Content: Thought content normal.           Imaging:     MRI brain does not demonstrate any metastasis or vascular malformations or strokes         Assessment & Plan:  Linda Bustamante is a 39 y.o. y/o female with history of breast cancer and normal MRI scan of brain.  I do not recommend any acute neurosurgical intervention.        Electronically signed by Odette Euceda MD on

## 2025-05-27 NOTE — TELEPHONE ENCOUNTER
----- Message from Dr. Prince Wheeler MD sent at 5/24/2025  2:53 PM EDT -----  YARA Langston can you please let the patient know her bone scan looks ok?  Thanks!  kalak  ----- Message -----  From: Tamika Deluna Incoming Radiant Results From [facility]  Sent: 5/23/2025   7:40 PM EDT  To: Prince Wheeler MD

## 2025-06-02 ENCOUNTER — TELEPHONE (OUTPATIENT)
Dept: SURGERY | Age: 40
End: 2025-06-02

## 2025-06-05 ENCOUNTER — HOSPITAL ENCOUNTER (OUTPATIENT)
Dept: INFUSION THERAPY | Age: 40
Discharge: HOME | End: 2025-06-05
Payer: COMMERCIAL

## 2025-06-05 ENCOUNTER — OFFICE VISIT (OUTPATIENT)
Age: 40
End: 2025-06-05
Payer: COMMERCIAL

## 2025-06-05 VITALS
OXYGEN SATURATION: 99 % | HEART RATE: 66 BPM | HEIGHT: 62 IN | BODY MASS INDEX: 24.31 KG/M2 | TEMPERATURE: 97.7 F | DIASTOLIC BLOOD PRESSURE: 67 MMHG | WEIGHT: 132.1 LBS | SYSTOLIC BLOOD PRESSURE: 112 MMHG

## 2025-06-05 DIAGNOSIS — C50.911 INVASIVE DUCTAL CARCINOMA OF BREAST, FEMALE, RIGHT (HCC): ICD-10-CM

## 2025-06-05 DIAGNOSIS — C50.111 MALIGNANT NEOPLASM OF CENTRAL PORTION OF RIGHT BREAST IN FEMALE, ESTROGEN RECEPTOR POSITIVE (HCC): ICD-10-CM

## 2025-06-05 DIAGNOSIS — Z17.0 MALIGNANT NEOPLASM OF CENTRAL PORTION OF RIGHT BREAST IN FEMALE, ESTROGEN RECEPTOR POSITIVE (HCC): ICD-10-CM

## 2025-06-05 LAB
ALBUMIN SERPL-MCNC: 4.2 G/DL (ref 3.5–5.2)
ALP SERPL-CCNC: 106 U/L (ref 35–104)
ALT SERPL-CCNC: 11 U/L (ref 0–35)
ANION GAP SERPL CALCULATED.3IONS-SCNC: 11 MMOL/L (ref 7–16)
AST SERPL-CCNC: 29 U/L (ref 0–35)
BASOPHILS # BLD: 0.03 K/UL (ref 0–0.2)
BASOPHILS NFR BLD: 1 % (ref 0–2)
BILIRUB SERPL-MCNC: 0.3 MG/DL (ref 0–1.2)
BUN SERPL-MCNC: 12 MG/DL (ref 6–20)
CALCIUM SERPL-MCNC: 9.4 MG/DL (ref 8.6–10)
CHLORIDE SERPL-SCNC: 105 MMOL/L (ref 98–107)
CO2 SERPL-SCNC: 24 MMOL/L (ref 22–29)
CREAT SERPL-MCNC: 0.7 MG/DL (ref 0.5–1)
EOSINOPHIL # BLD: 0.08 K/UL (ref 0.05–0.5)
EOSINOPHILS RELATIVE PERCENT: 2 % (ref 0–6)
ERYTHROCYTE [DISTWIDTH] IN BLOOD BY AUTOMATED COUNT: 12.8 % (ref 11.5–15)
GFR, ESTIMATED: >90 ML/MIN/1.73M2
GLUCOSE SERPL-MCNC: 95 MG/DL (ref 74–99)
HCT VFR BLD AUTO: 42.6 % (ref 34–48)
HGB BLD-MCNC: 14 G/DL (ref 11.5–15.5)
IMM GRANULOCYTES # BLD AUTO: <0.03 K/UL (ref 0–0.58)
IMM GRANULOCYTES NFR BLD: 0 % (ref 0–5)
LYMPHOCYTES NFR BLD: 1.66 K/UL (ref 1.5–4)
LYMPHOCYTES RELATIVE PERCENT: 39 % (ref 20–42)
MCH RBC QN AUTO: 31.5 PG (ref 26–35)
MCHC RBC AUTO-ENTMCNC: 32.9 G/DL (ref 32–34.5)
MCV RBC AUTO: 95.9 FL (ref 80–99.9)
MONOCYTES NFR BLD: 0.28 K/UL (ref 0.1–0.95)
MONOCYTES NFR BLD: 7 % (ref 2–12)
NEUTROPHILS NFR BLD: 51 % (ref 43–80)
NEUTS SEG NFR BLD: 2.17 K/UL (ref 1.8–7.3)
PLATELET # BLD AUTO: 223 K/UL (ref 130–450)
PMV BLD AUTO: 10.9 FL (ref 7–12)
POTASSIUM SERPL-SCNC: 4.2 MMOL/L (ref 3.5–5.1)
PROT SERPL-MCNC: 7.1 G/DL (ref 6.4–8.3)
RBC # BLD AUTO: 4.44 M/UL (ref 3.5–5.5)
SODIUM SERPL-SCNC: 140 MMOL/L (ref 136–145)
WBC OTHER # BLD: 4.2 K/UL (ref 4.5–11.5)

## 2025-06-05 PROCEDURE — G8420 CALC BMI NORM PARAMETERS: HCPCS | Performed by: STUDENT IN AN ORGANIZED HEALTH CARE EDUCATION/TRAINING PROGRAM

## 2025-06-05 PROCEDURE — 85025 COMPLETE CBC W/AUTO DIFF WBC: CPT

## 2025-06-05 PROCEDURE — 1036F TOBACCO NON-USER: CPT | Performed by: STUDENT IN AN ORGANIZED HEALTH CARE EDUCATION/TRAINING PROGRAM

## 2025-06-05 PROCEDURE — 80053 COMPREHEN METABOLIC PANEL: CPT

## 2025-06-05 PROCEDURE — 36415 COLL VENOUS BLD VENIPUNCTURE: CPT

## 2025-06-05 PROCEDURE — G8427 DOCREV CUR MEDS BY ELIG CLIN: HCPCS | Performed by: STUDENT IN AN ORGANIZED HEALTH CARE EDUCATION/TRAINING PROGRAM

## 2025-06-05 PROCEDURE — 99213 OFFICE O/P EST LOW 20 MIN: CPT | Performed by: STUDENT IN AN ORGANIZED HEALTH CARE EDUCATION/TRAINING PROGRAM

## 2025-06-05 PROCEDURE — 99212 OFFICE O/P EST SF 10 MIN: CPT | Performed by: STUDENT IN AN ORGANIZED HEALTH CARE EDUCATION/TRAINING PROGRAM

## 2025-06-05 RX ORDER — TAMOXIFEN CITRATE 10 MG/1
10 TABLET ORAL DAILY
Qty: 90 TABLET | Refills: 2 | Status: SHIPPED | OUTPATIENT
Start: 2025-06-05

## 2025-06-05 NOTE — PROGRESS NOTES
[facility]  [address]  [facility] [address]  Dept: [phone]  Loc: [phone]    Moon Lee MD   ·   MD Georgiana Mccollum APRN  ·  BELLA Torres        Hematology/Oncology   Clinic Progress Note              Patient: Linda Bustamante,  39 y.o. female    Date of Encounter: 06/05/2025     Referring Provider:  Samra Hill MD    Reason for Visit:   RIGHT breast cancer, ER/VA+, HER2-, early stage        PCP:  Yolis Chandler MD      Chief Complaint   Patient presents with    Breast Cancer         History of Present Illness  The patient is a 39-year-old female who presents for evaluation of breast cancer and leg pain.    She reports an improvement in her overall well-being since discontinuing tamoxifen. She has not detected any new breast masses. She is scheduled to see Pb on 06/25/2025. She is currently off tamoxifen and feels better.    Discomfort in her leg has been present for several months.   The pain radiates down to her foot and varies in intensity, with periods of relief followed by exacerbation. She experienced a significant increase in pain yesterday after a 3-day period of minimal discomfort. She reports no associated swelling or pain in the back of her leg or thigh. Adequate hydration is maintained. She also reports ankle pain but no knee pain. A history of sciatica is noted, though she is uncertain if it is related to her current symptoms.    She met with a neurosurgeon and did not seem impressed by clinical findings. Her head seems to be okay, with no vision changes, headaches, or sensations on the left side of her head. She cannot tell if the sensation is present because she never noticed it before until her hair was gone.         HPI from Initial Outpatient Consultation  (09/12/2024):  The patient is a 39 y.o. female who comes in for recent diagnosis of right sided breast cancer, having

## 2025-06-10 DIAGNOSIS — C50.911 INVASIVE DUCTAL CARCINOMA OF BREAST, FEMALE, RIGHT (HCC): ICD-10-CM

## 2025-06-12 RX ORDER — ASPIRIN 81 MG/1
81 TABLET ORAL DAILY
Qty: 90 TABLET | Refills: 1 | Status: SHIPPED | OUTPATIENT
Start: 2025-06-12

## 2025-06-13 NOTE — PROGRESS NOTES
Medical breast clinic note/[facility]  [phone]    Patient ID:  Linda Bustamante is a 39 y.o. female.     CARLENE Mckeon is a christen 39 y.o. female who presents for her right breast invasive ductal carcinoma, grade 2.  ER/[place] +, HER2/blanquita negative disease.  Risks for breast cancer include gender, maternal aunt with breast cancer, paternal aunt with breast cancer, maternal grandmother with colon cancer and maternal aunt with cervical cancer.  Menarche age 13.  .  First pregnancy age 14.    The breast cancer was confirmed on imaging at [facility] which revealed an irregular right breast mass with associated spiculation and distortion corresponding to palpable abnormality of concern.    2024 she underwent a right breast, 12:00 core needle biopsy.  Pathologic evaluation at [facility]:   Diagnosis --   A.  Right breast, 12 o'clock, core needle biopsy:   Invasive ductal carcinoma, moderately differentiated (see breast   cancer marker studies and     comment).   Focal benign fibroadenoma.   B.  Right axilla, core needle biopsy:   Benign lymph node and adipose tissue segments.     Breast cancer Marker studies:  Estrogen receptors positive, 90%  Progesterone receptors positive, 90%  HER2/blanquita negative (1+.    Diagnosis Comment --  Within the breast tissue cores, the maximum microscopic dimension of  invasive carcinoma is 17 mm.  The neoplasm is provisional [place]  histologic grade 2 (tubule score-3, nuclear score-2, mitotic score-1).   A p63 immunostain is utilized to exclude focal DCIS.  Intradepartmental consultation is obtained.     2024 MRI of the bilateral breast: No MRI findings concerning for malignancy in the left breast.  Right breast upper central position with maximum measurement on MRI 3.0 cm.  In addition there are multiple additional subcentimeter abnormal enhancing masses in the right breast, highly suspicious for malignancy involving the right upper central and right upper inner

## 2025-06-25 ENCOUNTER — OFFICE VISIT (OUTPATIENT)
Age: 40
End: 2025-06-25
Payer: COMMERCIAL

## 2025-06-25 ENCOUNTER — HOSPITAL ENCOUNTER (OUTPATIENT)
Dept: GENERAL RADIOLOGY | Age: 40
Discharge: HOME | End: 2025-06-27
Payer: COMMERCIAL

## 2025-06-25 VITALS
OXYGEN SATURATION: 98 % | HEART RATE: 74 BPM | TEMPERATURE: 98.4 F | RESPIRATION RATE: 12 BRPM | HEIGHT: 62 IN | DIASTOLIC BLOOD PRESSURE: 64 MMHG | WEIGHT: 129 LBS | SYSTOLIC BLOOD PRESSURE: 110 MMHG | BODY MASS INDEX: 23.74 KG/M2

## 2025-06-25 VITALS — WEIGHT: 130 LBS | BODY MASS INDEX: 23.92 KG/M2 | HEIGHT: 62 IN

## 2025-06-25 DIAGNOSIS — Z12.31 VISIT FOR SCREENING MAMMOGRAM: ICD-10-CM

## 2025-06-25 DIAGNOSIS — C50.111 MALIGNANT NEOPLASM OF CENTRAL PORTION OF RIGHT BREAST IN FEMALE, ESTROGEN RECEPTOR POSITIVE (HCC): Primary | ICD-10-CM

## 2025-06-25 DIAGNOSIS — Z01.818 PRE-OP TESTING: ICD-10-CM

## 2025-06-25 DIAGNOSIS — Z17.0 MALIGNANT NEOPLASM OF CENTRAL PORTION OF RIGHT BREAST IN FEMALE, ESTROGEN RECEPTOR POSITIVE (HCC): Primary | ICD-10-CM

## 2025-06-25 PROCEDURE — 1036F TOBACCO NON-USER: CPT | Performed by: NURSE PRACTITIONER

## 2025-06-25 PROCEDURE — 77063 BREAST TOMOSYNTHESIS BI: CPT

## 2025-06-25 PROCEDURE — G8427 DOCREV CUR MEDS BY ELIG CLIN: HCPCS | Performed by: NURSE PRACTITIONER

## 2025-06-25 PROCEDURE — 99213 OFFICE O/P EST LOW 20 MIN: CPT | Performed by: NURSE PRACTITIONER

## 2025-06-25 PROCEDURE — G8420 CALC BMI NORM PARAMETERS: HCPCS | Performed by: NURSE PRACTITIONER

## 2025-06-25 PROCEDURE — 99214 OFFICE O/P EST MOD 30 MIN: CPT | Performed by: NURSE PRACTITIONER

## 2025-06-25 RX ORDER — METHYLPREDNISOLONE 4 MG/1
TABLET ORAL
Qty: 1 KIT | Refills: 0 | Status: SHIPPED | OUTPATIENT
Start: 2025-06-25

## 2025-06-25 RX ORDER — MONTELUKAST SODIUM 10 MG/1
TABLET ORAL
COMMUNITY
Start: 2025-04-09

## 2025-06-25 ASSESSMENT — ENCOUNTER SYMPTOMS
BACK PAIN: 1
SINUS PAIN: 0
BLOOD IN STOOL: 0
GASTROINTESTINAL NEGATIVE: 1
APNEA: 0
RECTAL PAIN: 0
STRIDOR: 0

## 2025-06-25 NOTE — PATIENT INSTRUCTIONS
You will return for breast MRI 1/2026. Schedulers will call to schedule.   Once completed office will call to schedule office visit with Abby Wakefield NP     Please contact the office with any questions/concerns at [phone]

## 2025-08-26 ENCOUNTER — TELEPHONE (OUTPATIENT)
Dept: SURGERY | Age: 40
End: 2025-08-26

## 2025-09-04 ENCOUNTER — HOSPITAL ENCOUNTER (OUTPATIENT)
Dept: INFUSION THERAPY | Age: 40
Discharge: HOME | End: 2025-09-04
Payer: COMMERCIAL

## 2025-09-04 ENCOUNTER — OFFICE VISIT (OUTPATIENT)
Age: 40
End: 2025-09-04
Payer: COMMERCIAL

## 2025-09-04 ENCOUNTER — PATIENT OUTREACH (OUTPATIENT)
Dept: CASE MANAGEMENT | Age: 40
End: 2025-09-04

## 2025-09-04 VITALS
TEMPERATURE: 97.8 F | BODY MASS INDEX: 24.8 KG/M2 | HEART RATE: 77 BPM | OXYGEN SATURATION: 97 % | DIASTOLIC BLOOD PRESSURE: 77 MMHG | HEIGHT: 62 IN | WEIGHT: 134.8 LBS | SYSTOLIC BLOOD PRESSURE: 107 MMHG

## 2025-09-04 DIAGNOSIS — C50.111 MALIGNANT NEOPLASM OF CENTRAL PORTION OF RIGHT BREAST IN FEMALE, ESTROGEN RECEPTOR POSITIVE (HCC): ICD-10-CM

## 2025-09-04 DIAGNOSIS — N95.1 PERIMENOPAUSAL VASOMOTOR SYMPTOMS: Primary | ICD-10-CM

## 2025-09-04 DIAGNOSIS — Z17.0 MALIGNANT NEOPLASM OF CENTRAL PORTION OF RIGHT BREAST IN FEMALE, ESTROGEN RECEPTOR POSITIVE (HCC): ICD-10-CM

## 2025-09-04 LAB
ALBUMIN SERPL-MCNC: 4.2 G/DL (ref 3.5–5.2)
ALP SERPL-CCNC: 92 U/L (ref 35–104)
ALT SERPL-CCNC: 11 U/L (ref 0–35)
ANION GAP SERPL CALCULATED.3IONS-SCNC: 12 MMOL/L (ref 7–16)
AST SERPL-CCNC: 24 U/L (ref 0–35)
BASOPHILS # BLD: 0.04 K/UL (ref 0–0.2)
BASOPHILS NFR BLD: 1 % (ref 0–2)
BILIRUB SERPL-MCNC: 0.3 MG/DL (ref 0–1.2)
BUN SERPL-MCNC: 14 MG/DL (ref 6–20)
CALCIUM SERPL-MCNC: 9.2 MG/DL (ref 8.6–10)
CHLORIDE SERPL-SCNC: 109 MMOL/L (ref 98–107)
CO2 SERPL-SCNC: 22 MMOL/L (ref 22–29)
CREAT SERPL-MCNC: 0.8 MG/DL (ref 0.5–1)
EOSINOPHIL # BLD: 0.09 K/UL (ref 0.05–0.5)
EOSINOPHILS RELATIVE PERCENT: 2 % (ref 0–6)
ERYTHROCYTE [DISTWIDTH] IN BLOOD BY AUTOMATED COUNT: 12.8 % (ref 11.5–15)
GFR, ESTIMATED: >90 ML/MIN/1.73M2
GLUCOSE SERPL-MCNC: 101 MG/DL (ref 74–99)
HCT VFR BLD AUTO: 41.7 % (ref 34–48)
HGB BLD-MCNC: 13.9 G/DL (ref 11.5–15.5)
IMM GRANULOCYTES # BLD AUTO: <0.03 K/UL (ref 0–0.58)
IMM GRANULOCYTES NFR BLD: 0 % (ref 0–5)
LYMPHOCYTES NFR BLD: 1.87 K/UL (ref 1.5–4)
LYMPHOCYTES RELATIVE PERCENT: 37 % (ref 20–42)
MCH RBC QN AUTO: 31.4 PG (ref 26–35)
MCHC RBC AUTO-ENTMCNC: 33.3 G/DL (ref 32–34.5)
MCV RBC AUTO: 94.1 FL (ref 80–99.9)
MONOCYTES NFR BLD: 0.3 K/UL (ref 0.1–0.95)
MONOCYTES NFR BLD: 6 % (ref 2–12)
NEUTROPHILS NFR BLD: 54 % (ref 43–80)
NEUTS SEG NFR BLD: 2.72 K/UL (ref 1.8–7.3)
PLATELET # BLD AUTO: 215 K/UL (ref 130–450)
PMV BLD AUTO: 10.9 FL (ref 7–12)
POTASSIUM SERPL-SCNC: 4 MMOL/L (ref 3.5–5.1)
PROT SERPL-MCNC: 6.9 G/DL (ref 6.4–8.3)
RBC # BLD AUTO: 4.43 M/UL (ref 3.5–5.5)
SODIUM SERPL-SCNC: 142 MMOL/L (ref 136–145)
WBC OTHER # BLD: 5 K/UL (ref 4.5–11.5)

## 2025-09-04 PROCEDURE — 1036F TOBACCO NON-USER: CPT | Performed by: STUDENT IN AN ORGANIZED HEALTH CARE EDUCATION/TRAINING PROGRAM

## 2025-09-04 PROCEDURE — 80053 COMPREHEN METABOLIC PANEL: CPT

## 2025-09-04 PROCEDURE — G8427 DOCREV CUR MEDS BY ELIG CLIN: HCPCS | Performed by: STUDENT IN AN ORGANIZED HEALTH CARE EDUCATION/TRAINING PROGRAM

## 2025-09-04 PROCEDURE — 99214 OFFICE O/P EST MOD 30 MIN: CPT | Performed by: STUDENT IN AN ORGANIZED HEALTH CARE EDUCATION/TRAINING PROGRAM

## 2025-09-04 PROCEDURE — 36415 COLL VENOUS BLD VENIPUNCTURE: CPT

## 2025-09-04 PROCEDURE — G8420 CALC BMI NORM PARAMETERS: HCPCS | Performed by: STUDENT IN AN ORGANIZED HEALTH CARE EDUCATION/TRAINING PROGRAM

## 2025-09-04 PROCEDURE — 85025 COMPLETE CBC W/AUTO DIFF WBC: CPT

## 2025-09-04 PROCEDURE — 99213 OFFICE O/P EST LOW 20 MIN: CPT | Performed by: STUDENT IN AN ORGANIZED HEALTH CARE EDUCATION/TRAINING PROGRAM

## 2025-09-04 RX ORDER — CITALOPRAM HYDROBROMIDE 10 MG/1
10 TABLET ORAL DAILY
Qty: 30 TABLET | Refills: 2 | Status: SHIPPED | OUTPATIENT
Start: 2025-09-04

## (undated) DEVICE — NEEDLE HYPO 27GA L15IN REG BVL W O SFTY FOR SYR DISPOSABLE

## (undated) DEVICE — GLOVE SURG SZ 65 THK91MIL LTX FREE SYN POLYISOPRENE

## (undated) DEVICE — APPLIER LIG CLP M L11IN TI STR RNG HNDL FOR 20 CLP DISP

## (undated) DEVICE — TUBING, SUCTION, 3/16" X 12', STRAIGHT: Brand: MEDLINE

## (undated) DEVICE — ELECTRODE PT RET AD L9FT HI MOIST COND ADH HYDRGEL CORDED

## (undated) DEVICE — UNIVERSAL DRAPE: Brand: MEDLINE INDUSTRIES, INC.

## (undated) DEVICE — STERILE POLYISOPRENE POWDER-FREE SURGICAL GLOVES: Brand: PROTEXIS

## (undated) DEVICE — MARKER SURG MARGIN STD 6 CLR INK ASST CORR-MIN ORDER 6 EACH

## (undated) DEVICE — BANDAGE,GAUZE,4.5"X4.1YD,STERILE,LF: Brand: MEDLINE

## (undated) DEVICE — Y-TYPE TUR/BLADDER IRRIGATION SET, REGULATING CLAMP

## (undated) DEVICE — BNDG,ELSTC,MATRIX,STRL,6"X5YD,LF,HOOK&LP: Brand: MEDLINE

## (undated) DEVICE — DRAPE,REIN 53X77,STERILE: Brand: MEDLINE

## (undated) DEVICE — BOWL MED L 32OZ PLAS W/ MOLD GRAD EZ OPN PEEL PCH

## (undated) DEVICE — SHEET,DRAPE,53X77,STERILE: Brand: MEDLINE

## (undated) DEVICE — PLASMABLADE PS210-030S 3.0S LOCK: Brand: PLASMABLADE™

## (undated) DEVICE — 3M™ STERI-STRIP™ REINFORCED ADHESIVE SKIN CLOSURES, R1548, 1 IN X 5 IN (25 MM X 125 MM), 4 STRIPS/ENVELOPE: Brand: 3M™ STERI-STRIP™

## (undated) DEVICE — RETRACTOR 50-101-1 RADIALUX US: Brand: RADIALUX™

## (undated) DEVICE — SHEARS ENDOSCP L9CM CRV HARM FOCS +

## (undated) DEVICE — BLADE,STAINLESS-STEEL,15,STRL,DISPOSABLE: Brand: MEDLINE

## (undated) DEVICE — LIQUIBAND RAPID ADHESIVE 36/CS 0.8ML: Brand: MEDLINE

## (undated) DEVICE — STRAP POS MP 30X3 IN HK LOOP CLOSURE FOAM DISP

## (undated) DEVICE — CYSTO/BLADDER IRRIGATION SET, REGULATING CLAMP

## (undated) DEVICE — BLADE,STAINLESS-STEEL,10,STRL,DISPOSABLE: Brand: MEDLINE

## (undated) DEVICE — PROBE GAMMA TRUNODE

## (undated) DEVICE — SYRINGE MED 50ML LUERLOCK TIP

## (undated) DEVICE — SYRINGE MED 10ML TRNSLUC BRL PLUNG BLK MRK POLYPR CTRL

## (undated) DEVICE — Device

## (undated) DEVICE — GLOVE ORANGE PI 7   MSG9070

## (undated) DEVICE — STRIP,CLOSURE,WOUND,MEDI-STRIP,1/2X4: Brand: MEDLINE

## (undated) DEVICE — STANDARD HYPODERMIC NEEDLE,ALUMINUM HUB: Brand: MONOJECT

## (undated) DEVICE — BRA SURG M BGE MARENA FR SNAP CMFRT WR

## (undated) DEVICE — WIPES SKIN CLOTH READYPREP 9 X 10.5 IN 2% CHLORHEX GLUCONATE CHG PREOP

## (undated) DEVICE — POUCH STRL SELF-SEAL 7.5X13IN

## (undated) DEVICE — TOWEL,OR,DSP,ST,BLUE,DLX,10/PK,8PK/CS: Brand: MEDLINE

## (undated) DEVICE — DOUBLE BASIN SET: Brand: MEDLINE INDUSTRIES, INC.

## (undated) DEVICE — AGENT HEMSTAT 5GM ARISTA AH

## (undated) DEVICE — SOLUTION IRRIG 3000ML 0.9% SOD CHL USP UROMATIC PLAS CONT

## (undated) DEVICE — YANKAUER,OPEN TIP,W/O VENT,STERILE: Brand: MEDLINE INDUSTRIES, INC.